# Patient Record
Sex: MALE | Race: WHITE | NOT HISPANIC OR LATINO | Employment: OTHER | URBAN - METROPOLITAN AREA
[De-identification: names, ages, dates, MRNs, and addresses within clinical notes are randomized per-mention and may not be internally consistent; named-entity substitution may affect disease eponyms.]

---

## 2022-09-20 ENCOUNTER — HOSPITAL ENCOUNTER (INPATIENT)
Facility: HOSPITAL | Age: 42
LOS: 21 days | Discharge: HOME | DRG: 641 | End: 2022-10-12
Attending: EMERGENCY MEDICINE | Admitting: EMERGENCY MEDICINE
Payer: MEDICARE

## 2022-09-20 DIAGNOSIS — R07.9 CHEST PAIN: ICD-10-CM

## 2022-09-20 DIAGNOSIS — B35.1 ONYCHOMYCOSIS: Primary | ICD-10-CM

## 2022-09-20 DIAGNOSIS — B35.3 TINEA PEDIS OF BOTH FEET: ICD-10-CM

## 2022-09-20 DIAGNOSIS — E86.0 DEHYDRATION: ICD-10-CM

## 2022-09-20 DIAGNOSIS — G80.8 OTHER CEREBRAL PALSY: ICD-10-CM

## 2022-09-20 DIAGNOSIS — R00.0 TACHYCARDIA: ICD-10-CM

## 2022-09-20 LAB
ALBUMIN SERPL BCP-MCNC: 5 G/DL (ref 3.5–5.2)
ALP SERPL-CCNC: 101 U/L (ref 55–135)
ALT SERPL W/O P-5'-P-CCNC: 19 U/L (ref 10–44)
ANION GAP SERPL CALC-SCNC: 24 MMOL/L (ref 8–16)
AST SERPL-CCNC: 18 U/L (ref 10–40)
BASOPHILS # BLD AUTO: 0.06 K/UL (ref 0–0.2)
BASOPHILS NFR BLD: 0.3 % (ref 0–1.9)
BILIRUB SERPL-MCNC: 2.4 MG/DL (ref 0.1–1)
BUN SERPL-MCNC: 17 MG/DL (ref 6–20)
CALCIUM SERPL-MCNC: 10.7 MG/DL (ref 8.7–10.5)
CHLORIDE SERPL-SCNC: 113 MMOL/L (ref 95–110)
CK SERPL-CCNC: 43 U/L (ref 20–200)
CO2 SERPL-SCNC: 14 MMOL/L (ref 23–29)
CREAT SERPL-MCNC: 1.2 MG/DL (ref 0.5–1.4)
CTP QC/QA: YES
DIFFERENTIAL METHOD: ABNORMAL
EOSINOPHIL # BLD AUTO: 0 K/UL (ref 0–0.5)
EOSINOPHIL NFR BLD: 0.1 % (ref 0–8)
ERYTHROCYTE [DISTWIDTH] IN BLOOD BY AUTOMATED COUNT: 13.2 % (ref 11.5–14.5)
EST. GFR  (NO RACE VARIABLE): >60 ML/MIN/1.73 M^2
GLUCOSE SERPL-MCNC: 115 MG/DL (ref 70–110)
HCT VFR BLD AUTO: 54.4 % (ref 40–54)
HGB BLD-MCNC: 19 G/DL (ref 14–18)
IMM GRANULOCYTES # BLD AUTO: 0.06 K/UL (ref 0–0.04)
IMM GRANULOCYTES NFR BLD AUTO: 0.3 % (ref 0–0.5)
LYMPHOCYTES # BLD AUTO: 1.6 K/UL (ref 1–4.8)
LYMPHOCYTES NFR BLD: 8.9 % (ref 18–48)
MAGNESIUM SERPL-MCNC: 2.2 MG/DL (ref 1.6–2.6)
MCH RBC QN AUTO: 29.9 PG (ref 27–31)
MCHC RBC AUTO-ENTMCNC: 34.9 G/DL (ref 32–36)
MCV RBC AUTO: 86 FL (ref 82–98)
MONOCYTES # BLD AUTO: 1.1 K/UL (ref 0.3–1)
MONOCYTES NFR BLD: 5.9 % (ref 4–15)
NEUTROPHILS # BLD AUTO: 15.1 K/UL (ref 1.8–7.7)
NEUTROPHILS NFR BLD: 84.5 % (ref 38–73)
NRBC BLD-RTO: 0 /100 WBC
PLATELET # BLD AUTO: 368 K/UL (ref 150–450)
PMV BLD AUTO: 9.1 FL (ref 9.2–12.9)
POTASSIUM SERPL-SCNC: 4.5 MMOL/L (ref 3.5–5.1)
PROT SERPL-MCNC: 8.7 G/DL (ref 6–8.4)
RBC # BLD AUTO: 6.36 M/UL (ref 4.6–6.2)
SARS-COV-2 RDRP RESP QL NAA+PROBE: NEGATIVE
SODIUM SERPL-SCNC: 151 MMOL/L (ref 136–145)
WBC # BLD AUTO: 17.83 K/UL (ref 3.9–12.7)

## 2022-09-20 PROCEDURE — 82550 ASSAY OF CK (CPK): CPT | Performed by: EMERGENCY MEDICINE

## 2022-09-20 PROCEDURE — 96374 THER/PROPH/DIAG INJ IV PUSH: CPT

## 2022-09-20 PROCEDURE — 99285 EMERGENCY DEPT VISIT HI MDM: CPT | Mod: 25

## 2022-09-20 PROCEDURE — 85025 COMPLETE CBC W/AUTO DIFF WBC: CPT | Performed by: EMERGENCY MEDICINE

## 2022-09-20 PROCEDURE — 96375 TX/PRO/DX INJ NEW DRUG ADDON: CPT

## 2022-09-20 PROCEDURE — 63600175 PHARM REV CODE 636 W HCPCS: Performed by: EMERGENCY MEDICINE

## 2022-09-20 PROCEDURE — 83735 ASSAY OF MAGNESIUM: CPT | Performed by: EMERGENCY MEDICINE

## 2022-09-20 PROCEDURE — U0002 COVID-19 LAB TEST NON-CDC: HCPCS | Performed by: EMERGENCY MEDICINE

## 2022-09-20 PROCEDURE — 96361 HYDRATE IV INFUSION ADD-ON: CPT

## 2022-09-20 PROCEDURE — 93010 ELECTROCARDIOGRAM REPORT: CPT | Mod: ,,, | Performed by: INTERNAL MEDICINE

## 2022-09-20 PROCEDURE — 80053 COMPREHEN METABOLIC PANEL: CPT | Performed by: EMERGENCY MEDICINE

## 2022-09-20 PROCEDURE — 25000003 PHARM REV CODE 250: Performed by: EMERGENCY MEDICINE

## 2022-09-20 PROCEDURE — 93005 ELECTROCARDIOGRAM TRACING: CPT

## 2022-09-20 PROCEDURE — 93010 EKG 12-LEAD: ICD-10-PCS | Mod: ,,, | Performed by: INTERNAL MEDICINE

## 2022-09-20 RX ORDER — HALOPERIDOL 5 MG/ML
5 INJECTION INTRAMUSCULAR
Status: DISCONTINUED | OUTPATIENT
Start: 2022-09-20 | End: 2022-09-21

## 2022-09-20 RX ORDER — DIPHENHYDRAMINE HYDROCHLORIDE 50 MG/ML
12.5 INJECTION INTRAMUSCULAR; INTRAVENOUS
Status: DISCONTINUED | OUTPATIENT
Start: 2022-09-20 | End: 2022-09-21

## 2022-09-20 RX ADMIN — FOLIC ACID: 5 INJECTION, SOLUTION INTRAMUSCULAR; INTRAVENOUS; SUBCUTANEOUS at 10:09

## 2022-09-20 RX ADMIN — SODIUM CHLORIDE 2000 ML: 0.9 INJECTION, SOLUTION INTRAVENOUS at 10:09

## 2022-09-21 PROBLEM — E83.39 HYPOPHOSPHATEMIA: Status: ACTIVE | Noted: 2022-09-21

## 2022-09-21 PROBLEM — E83.52 HYPERCALCEMIA: Status: ACTIVE | Noted: 2022-09-21

## 2022-09-21 PROBLEM — E87.0 DEHYDRATION WITH HYPERNATREMIA: Status: ACTIVE | Noted: 2022-09-21

## 2022-09-21 PROBLEM — R65.10 SIRS (SYSTEMIC INFLAMMATORY RESPONSE SYNDROME): Status: ACTIVE | Noted: 2022-09-21

## 2022-09-21 PROBLEM — B35.3 TINEA PEDIS OF BOTH FEET: Status: ACTIVE | Noted: 2022-09-21

## 2022-09-21 PROBLEM — B35.1 ONYCHOMYCOSIS: Status: ACTIVE | Noted: 2022-09-21

## 2022-09-21 PROBLEM — G80.9 CEREBRAL PALSY: Status: ACTIVE | Noted: 2022-09-21

## 2022-09-21 PROBLEM — E86.0 DEHYDRATION: Status: ACTIVE | Noted: 2022-09-21

## 2022-09-21 LAB
ALBUMIN SERPL BCP-MCNC: 3.8 G/DL (ref 3.5–5.2)
ALLENS TEST: ABNORMAL
ALP SERPL-CCNC: 72 U/L (ref 55–135)
ALT SERPL W/O P-5'-P-CCNC: 17 U/L (ref 10–44)
ANION GAP SERPL CALC-SCNC: 11 MMOL/L (ref 8–16)
AST SERPL-CCNC: 14 U/L (ref 10–40)
B-OH-BUTYR BLD STRIP-SCNC: 4.6 MMOL/L (ref 0–0.5)
BASOPHILS # BLD AUTO: 0.04 K/UL (ref 0–0.2)
BASOPHILS NFR BLD: 0.3 % (ref 0–1.9)
BILIRUB SERPL-MCNC: 2.2 MG/DL (ref 0.1–1)
BILIRUB UR QL STRIP: NEGATIVE
BUN SERPL-MCNC: 11 MG/DL (ref 6–20)
CALCIUM SERPL-MCNC: 8.7 MG/DL (ref 8.7–10.5)
CHLORIDE SERPL-SCNC: 117 MMOL/L (ref 95–110)
CLARITY UR: CLEAR
CO2 SERPL-SCNC: 17 MMOL/L (ref 23–29)
COLOR UR: YELLOW
CREAT SERPL-MCNC: 0.8 MG/DL (ref 0.5–1.4)
DELSYS: ABNORMAL
DIFFERENTIAL METHOD: ABNORMAL
EOSINOPHIL # BLD AUTO: 0 K/UL (ref 0–0.5)
EOSINOPHIL NFR BLD: 0.2 % (ref 0–8)
ERYTHROCYTE [DISTWIDTH] IN BLOOD BY AUTOMATED COUNT: 13.1 % (ref 11.5–14.5)
EST. GFR  (NO RACE VARIABLE): >60 ML/MIN/1.73 M^2
GLUCOSE SERPL-MCNC: 99 MG/DL (ref 70–110)
GLUCOSE UR QL STRIP: NEGATIVE
HCO3 UR-SCNC: 18.3 MMOL/L (ref 24–28)
HCT VFR BLD AUTO: 44.8 % (ref 40–54)
HGB BLD-MCNC: 15.3 G/DL (ref 14–18)
HGB UR QL STRIP: ABNORMAL
IMM GRANULOCYTES # BLD AUTO: 0.04 K/UL (ref 0–0.04)
IMM GRANULOCYTES NFR BLD AUTO: 0.3 % (ref 0–0.5)
KETONES UR QL STRIP: ABNORMAL
LACTATE SERPL-SCNC: 1.7 MMOL/L (ref 0.5–2.2)
LEUKOCYTE ESTERASE UR QL STRIP: NEGATIVE
LYMPHOCYTES # BLD AUTO: 2.2 K/UL (ref 1–4.8)
LYMPHOCYTES NFR BLD: 17.6 % (ref 18–48)
MAGNESIUM SERPL-MCNC: 1.9 MG/DL (ref 1.6–2.6)
MCH RBC QN AUTO: 29.3 PG (ref 27–31)
MCHC RBC AUTO-ENTMCNC: 34.2 G/DL (ref 32–36)
MCV RBC AUTO: 86 FL (ref 82–98)
MODE: ABNORMAL
MONOCYTES # BLD AUTO: 1 K/UL (ref 0.3–1)
MONOCYTES NFR BLD: 8.3 % (ref 4–15)
NEUTROPHILS # BLD AUTO: 9 K/UL (ref 1.8–7.7)
NEUTROPHILS NFR BLD: 73.3 % (ref 38–73)
NITRITE UR QL STRIP: NEGATIVE
NRBC BLD-RTO: 0 /100 WBC
PCO2 BLDA: 30 MMHG (ref 35–45)
PH SMN: 7.39 [PH] (ref 7.35–7.45)
PH UR STRIP: 6 [PH] (ref 5–8)
PHOSPHATE SERPL-MCNC: 2.6 MG/DL (ref 2.7–4.5)
PLATELET # BLD AUTO: 285 K/UL (ref 150–450)
PMV BLD AUTO: 8.8 FL (ref 9.2–12.9)
PO2 BLDA: 60 MMHG (ref 40–60)
POC BE: -5 MMOL/L
POC SATURATED O2: 91 % (ref 95–100)
POC TCO2: 19 MMOL/L (ref 24–29)
POTASSIUM SERPL-SCNC: 3.9 MMOL/L (ref 3.5–5.1)
PROT SERPL-MCNC: 6.5 G/DL (ref 6–8.4)
PROT UR QL STRIP: ABNORMAL
PTH-INTACT SERPL-MCNC: 115.2 PG/ML (ref 9–77)
RBC # BLD AUTO: 5.22 M/UL (ref 4.6–6.2)
SAMPLE: ABNORMAL
SITE: ABNORMAL
SODIUM SERPL-SCNC: 145 MMOL/L (ref 136–145)
SP GR UR STRIP: 1.02 (ref 1–1.03)
SP02: 96
T4 FREE SERPL-MCNC: 1.27 NG/DL (ref 0.71–1.51)
TSH SERPL DL<=0.005 MIU/L-ACNC: 0.12 UIU/ML (ref 0.4–4)
URN SPEC COLLECT METH UR: ABNORMAL
UROBILINOGEN UR STRIP-ACNC: ABNORMAL EU/DL
WBC # BLD AUTO: 12.31 K/UL (ref 3.9–12.7)

## 2022-09-21 PROCEDURE — 25000003 PHARM REV CODE 250: Performed by: INTERNAL MEDICINE

## 2022-09-21 PROCEDURE — 82010 KETONE BODYS QUAN: CPT | Performed by: EMERGENCY MEDICINE

## 2022-09-21 PROCEDURE — 85025 COMPLETE CBC W/AUTO DIFF WBC: CPT | Performed by: INTERNAL MEDICINE

## 2022-09-21 PROCEDURE — 83970 ASSAY OF PARATHORMONE: CPT | Performed by: INTERNAL MEDICINE

## 2022-09-21 PROCEDURE — 25000003 PHARM REV CODE 250: Performed by: HOSPITALIST

## 2022-09-21 PROCEDURE — 81003 URINALYSIS AUTO W/O SCOPE: CPT | Performed by: INTERNAL MEDICINE

## 2022-09-21 PROCEDURE — 83605 ASSAY OF LACTIC ACID: CPT | Performed by: EMERGENCY MEDICINE

## 2022-09-21 PROCEDURE — 84439 ASSAY OF FREE THYROXINE: CPT | Performed by: INTERNAL MEDICINE

## 2022-09-21 PROCEDURE — 80053 COMPREHEN METABOLIC PANEL: CPT | Performed by: INTERNAL MEDICINE

## 2022-09-21 PROCEDURE — 84443 ASSAY THYROID STIM HORMONE: CPT | Performed by: INTERNAL MEDICINE

## 2022-09-21 PROCEDURE — S5010 5% DEXTROSE AND 0.45% SALINE: HCPCS | Performed by: INTERNAL MEDICINE

## 2022-09-21 PROCEDURE — 84100 ASSAY OF PHOSPHORUS: CPT | Performed by: INTERNAL MEDICINE

## 2022-09-21 PROCEDURE — 99900035 HC TECH TIME PER 15 MIN (STAT)

## 2022-09-21 PROCEDURE — 11000001 HC ACUTE MED/SURG PRIVATE ROOM

## 2022-09-21 PROCEDURE — 82803 BLOOD GASES ANY COMBINATION: CPT

## 2022-09-21 PROCEDURE — 83735 ASSAY OF MAGNESIUM: CPT | Performed by: INTERNAL MEDICINE

## 2022-09-21 RX ORDER — SODIUM CHLORIDE 0.9 % (FLUSH) 0.9 %
10 SYRINGE (ML) INJECTION EVERY 12 HOURS PRN
Status: DISCONTINUED | OUTPATIENT
Start: 2022-09-21 | End: 2022-10-12 | Stop reason: HOSPADM

## 2022-09-21 RX ORDER — DEXTROSE MONOHYDRATE AND SODIUM CHLORIDE 5; .45 G/100ML; G/100ML
INJECTION, SOLUTION INTRAVENOUS CONTINUOUS
Status: DISCONTINUED | OUTPATIENT
Start: 2022-09-21 | End: 2022-09-22

## 2022-09-21 RX ORDER — TALC
6 POWDER (GRAM) TOPICAL NIGHTLY PRN
Status: DISCONTINUED | OUTPATIENT
Start: 2022-09-21 | End: 2022-10-12 | Stop reason: HOSPADM

## 2022-09-21 RX ORDER — MAG HYDROX/ALUMINUM HYD/SIMETH 200-200-20
30 SUSPENSION, ORAL (FINAL DOSE FORM) ORAL 4 TIMES DAILY PRN
Status: DISCONTINUED | OUTPATIENT
Start: 2022-09-21 | End: 2022-10-12 | Stop reason: HOSPADM

## 2022-09-21 RX ORDER — ACETAMINOPHEN 325 MG/1
650 TABLET ORAL EVERY 4 HOURS PRN
Status: DISCONTINUED | OUTPATIENT
Start: 2022-09-21 | End: 2022-10-12 | Stop reason: HOSPADM

## 2022-09-21 RX ORDER — SODIUM,POTASSIUM PHOSPHATES 280-250MG
1 POWDER IN PACKET (EA) ORAL ONCE
Status: COMPLETED | OUTPATIENT
Start: 2022-09-21 | End: 2022-09-21

## 2022-09-21 RX ORDER — AMOXICILLIN 250 MG
1 CAPSULE ORAL 2 TIMES DAILY PRN
Status: DISCONTINUED | OUTPATIENT
Start: 2022-09-21 | End: 2022-10-12 | Stop reason: HOSPADM

## 2022-09-21 RX ORDER — ONDANSETRON 2 MG/ML
4 INJECTION INTRAMUSCULAR; INTRAVENOUS EVERY 8 HOURS PRN
Status: DISCONTINUED | OUTPATIENT
Start: 2022-09-21 | End: 2022-10-12 | Stop reason: HOSPADM

## 2022-09-21 RX ORDER — PROCHLORPERAZINE EDISYLATE 5 MG/ML
5 INJECTION INTRAMUSCULAR; INTRAVENOUS EVERY 6 HOURS PRN
Status: DISCONTINUED | OUTPATIENT
Start: 2022-09-21 | End: 2022-10-12 | Stop reason: HOSPADM

## 2022-09-21 RX ORDER — POLYETHYLENE GLYCOL 3350 17 G/17G
17 POWDER, FOR SOLUTION ORAL DAILY
Status: DISCONTINUED | OUTPATIENT
Start: 2022-09-21 | End: 2022-10-12 | Stop reason: HOSPADM

## 2022-09-21 RX ORDER — TERBINAFINE HYDROCHLORIDE 250 MG/1
250 TABLET ORAL DAILY
Status: COMPLETED | OUTPATIENT
Start: 2022-09-21 | End: 2022-10-04

## 2022-09-21 RX ORDER — IBUPROFEN 200 MG
16 TABLET ORAL
Status: DISCONTINUED | OUTPATIENT
Start: 2022-09-21 | End: 2022-10-12 | Stop reason: HOSPADM

## 2022-09-21 RX ORDER — NALOXONE HCL 0.4 MG/ML
0.02 VIAL (ML) INJECTION
Status: DISCONTINUED | OUTPATIENT
Start: 2022-09-21 | End: 2022-10-12 | Stop reason: HOSPADM

## 2022-09-21 RX ORDER — IBUPROFEN 200 MG
24 TABLET ORAL
Status: DISCONTINUED | OUTPATIENT
Start: 2022-09-21 | End: 2022-10-12 | Stop reason: HOSPADM

## 2022-09-21 RX ORDER — OXYCODONE HYDROCHLORIDE 5 MG/1
5 TABLET ORAL EVERY 6 HOURS PRN
Status: DISCONTINUED | OUTPATIENT
Start: 2022-09-21 | End: 2022-09-21

## 2022-09-21 RX ORDER — GLUCAGON 1 MG
1 KIT INJECTION
Status: DISCONTINUED | OUTPATIENT
Start: 2022-09-21 | End: 2022-10-12 | Stop reason: HOSPADM

## 2022-09-21 RX ORDER — SIMETHICONE 80 MG
1 TABLET,CHEWABLE ORAL 4 TIMES DAILY PRN
Status: DISCONTINUED | OUTPATIENT
Start: 2022-09-21 | End: 2022-10-12 | Stop reason: HOSPADM

## 2022-09-21 RX ADMIN — THERA TABS 1 TABLET: TAB at 10:09

## 2022-09-21 RX ADMIN — POLYETHYLENE GLYCOL 3350 17 G: 17 POWDER, FOR SOLUTION ORAL at 10:09

## 2022-09-21 RX ADMIN — DEXTROSE AND SODIUM CHLORIDE: 5; .45 INJECTION, SOLUTION INTRAVENOUS at 06:09

## 2022-09-21 RX ADMIN — Medication 1 PACKET: at 10:09

## 2022-09-21 RX ADMIN — TERBINAFINE TABLETS 250 MG 250 MG: 250 TABLET ORAL at 05:09

## 2022-09-21 NOTE — NURSING
[facility] [place] [place]es [place] -- 4 [place]      9/21/2022       Skin assessed on: Admit      [x] No Pressure Injuries Present    [x]Prevention Measures Documented    [] Yes LDA  for Pressure Injury Previously documented     [] Yes New Pressure Injury Discovered   [] LDA for New Pressure Injury Added      Attending RN:  Helen Farfan RN     Second RN: Christy ElmoreRN

## 2022-09-21 NOTE — PROGRESS NOTES
Call back received from wife of father's friend (Julisa Adan)  [phone].  Ms. Adan stated that the patient's father's family in [facility] have been notified of the events.  The father did not have relatives here.  Ms. Montano stated that the patient's father had an  and they have called the  and are awaiting a call back.  SW asked Ms. Montano if she could provide our (hospital's) contact information to the .  She stated that she would do so.    Contacts:  Friends of patient's father:  Hafsa Londono  [phone]  Julisa Montano : [phone]

## 2022-09-21 NOTE — HPI
"Mr. Landin is a 40yo man with a past medical history of cerebral palsy with spasticity and anxiety.  At baseline, he is normally cared for by his father at home.  He is a poor historian and cannot state to me what happened today.    Dr. Spring, the ED staff, was able to gather some collateral from the EMS on arrival.  Apparently his father and he had not been seen for quite some time, so a care check was initiated.  On arrival, it was found that the patient's father had , and the patient was confined to his bed due to his mobility issues (bed-bound).  The last known contact with the patient and his father was 22.  In the interim, the patient has had no water or food, and had to languish in his own urine and stool until help arrived today.  The patient would not speak to me about his father's death despite my bringing it up directly.  All he will say repeatedly is, "something happened."  EMS did notify the patient on arrival that his father was , at which point he did become extremely upset.    In the ED his VS's were BP (!) 152/94   Pulse (!) 158 -> 127 -> 114   Temp 98 °F (36.7 °C)   Resp 19   Ht 6' (1.829 m)   Wt 68 kg (150 lb)   SpO2 (!) 94% RA  BMI 20.34 kg/m².  Labs showed WBC 18, Hg 19, HCT 54, .  , HCO3 14, BUN 17, Cr 1.2, AG 24.  Gluc 115, Ca 10.7, TB 2.4, normal LFT's.  CPK 43, LA 1.7, BHB 4.6.  COVID NEG. VBG pH 7.39, PCO2 30.     No radiographs were done in the ED.  (CXR now ordered and pending though).  EKG showed sinus tachycardia to 148 with MARYAM and non-specific ST changes.     In the ED he was treated with a banana bag bolus 2211, and NS 2L bolus 2218.  "

## 2022-09-21 NOTE — ED NOTES
"Patient fearful and expressing sadness with tears over losing father.  He stated "I am all alone now it'll never be okay again."  This RN tried to reassure patient and gently talk to him.  He is disheveled and hasn't had a bath in days.  Will continue to monitor.  "

## 2022-09-21 NOTE — ASSESSMENT & PLAN NOTE
Consult social work for placement  Unsure if he has any family to care for him now that his father has

## 2022-09-21 NOTE — PLAN OF CARE
[facility] - Med Surg  Initial Discharge Assessment       Primary Care Provider: Primary Doctor No    Admission Diagnosis: Dehydration [E86.0]  Tachycardia [R00.0]  Chest pain [R07.9]    Admission Date: 2022  Expected Discharge Date:     Discharge Barriers Identified: No family/friends to help, Other (see comments)    Payor: [employer] [place] MEDICARE / Plan: [employer] SNP (SPECIAL NEEDS PLAN) / Product Type: Medicare Advantage /     No emergency contact information on file.    Discharge Plan A: Other (TBD)         [id number] [place] - [address]  [address] [place] [address]  Phone: [phone] Fax: [phone]      Initial Assessment (most recent)       Adult Discharge Assessment - 22          Discharge Assessment    Assessment Type Discharge Planning Assessment     Confirmed/corrected address, phone number and insurance --   Per chart review    Source of Information other (see comments)     If unable to respond/provide information was family/caregiver contacted? No Contact Information Available   Parents are .  Awaiting call from family atty.    Reason For Admission Principal Problem:Dehydration with hypernatremia     Lives With parent(s)     Facility Arrived From: Home     Do you expect to return to your current living situation? No   Case management consulted to locate next of kin.    Prior to hospitilization cognitive status: Unable to Assess     Current cognitive status: Unable to Assess     Walking or Climbing Stairs Difficulty other (see comments)   unable to determine    Dressing/Bathing Difficulty bathing difficulty, assistance 1 person   Appears to have needed assistance.  Upper extremity contractures noted.    Equipment Currently Used at Home other (see comments)   unknown    Do you have prescription coverage? Yes     Coverage [employer]     Are you on dialysis? No     Do you take coumadin? No     Discharge Plan A Other   TBD    DME  Needed Upon Discharge  other (see comments)   Would benefit from PT/OT eval for DME recommendations.    Discharge Plan discussed with: --   No caregiver available at this time.    Discharge Barriers Identified No family/friends to help;Other (see comments)        Relationship/Environment    Name(s) of Who Lives With Patient Parents .  Father most recent .                   Patient brought to hospital following a home wellness check by local law enforcement.  Patient's father discovered  in the home.  Case management has been consulted to locate next of kin.  Awaiting call from family friend and/or  to assist with locating family.

## 2022-09-21 NOTE — ASSESSMENT & PLAN NOTE
In the ED he was treated with a banana bag bolus 2211, and NS 2L bolus 2218.  May need to change to 1/4 NS soon  Follow up repeat CMP         dextrose 5 % and 0.45 % NaCl infusion, , Intravenous, Continuous, 150cc/hr

## 2022-09-21 NOTE — PROGRESS NOTES
Assumed care of Mr Miguelito Landin who is bedbound from cerebral palsy. Admitted with hypernatremia, hypercalcemia due to dehydration from lack of access to food/water after his caretaker/father . Electrolyte disturbances improved. Will continue IVF. Will look for wounds today with nursing. Social work consult for next of kin- no number in chart.    Maliha Chavarria MD  2022  8:38 AM

## 2022-09-21 NOTE — H&P
"[facility]  [facility] Medicine  History & Physical    Patient Name: Miguelito Landin  MRN: [id number]  Patient Class: IP- Inpatient  Admission Date: 2022  Attending Physician: Maliha Chavarria MD   Primary Care Provider: No primary care provider on file.         Patient information was obtained from patient, past medical records and ER records.     Subjective:     Principal Problem:Dehydration with hypernatremia    Chief Complaint:   Chief Complaint   Patient presents with    Dehydration     Pt arrived via EMS. Per EMS, "pt was found in bed for unknown amount of time covered with fecal matter and urine. Pt father is pt caregiver and was found  in residence. Last known contact with pt father was on . Pt reports he did not know his father was  or last known when he ate or had anything to drink. Pt is bed bound due to Cerebral Palsy with contractures to extremities. Pt became extremely upset when informed of father status and began SVT rhythm."        HPI: Mr. Landin is a 42yo man with a past medical history of cerebral palsy with spasticity and anxiety.  At baseline, he is normally cared for by his father at home.  He is a poor historian and cannot state to me what happened today.    Dr. Spring, the ED staff, was able to gather some collateral from the EMS on arrival.  Apparently his father and he had not been seen for quite some time, so a care check was initiated.  On arrival, it was found that the patient's father had , and the patient was confined to his bed due to his mobility issues (bed-bound).  The last known contact with the patient and his father was 22.  In the interim, the patient has had no water or food, and had to languish in his own urine and stool until help arrived today.  The patient would not speak to me about his father's death despite my bringing it up directly.  All he will say repeatedly is, "something happened."  EMS did notify the " patient on arrival that his father was , at which point he did become extremely upset.    In the ED his VS's were BP (!) 152/94   Pulse (!) 158 -> 127 -> 114   Temp 98 °F (36.7 °C)   Resp 19   Ht 6' (1.829 m)   Wt 68 kg (150 lb)   SpO2 (!) 94% RA  BMI 20.34 kg/m².  Labs showed WBC 18, Hg 19, HCT 54, .  , HCO3 14, BUN 17, Cr 1.2, AG 24.  Gluc 115, Ca 10.7, TB 2.4, normal LFT's.  CPK 43, LA 1.7, BHB 4.6.  COVID NEG. VBG pH 7.39, PCO2 30.     No radiographs were done in the ED.  (CXR now ordered and pending though).  EKG showed sinus tachycardia to 148 with MARYAM and non-specific ST changes.     In the ED he was treated with a banana bag bolus , and NS 2L bolus .      Past Medical History:   Diagnosis Date    Anxiety     CP (cerebral palsy)     Spasticity        History reviewed. No pertinent surgical history.    Review of patient's allergies indicates:   Allergen Reactions    Doxycycline Rash       No current facility-administered medications on file prior to encounter.     No current outpatient medications on file prior to encounter.     Family History       Problem Relation (Age of Onset)    Cancer Paternal Uncle    Heart attack Maternal Grandmother    Migraines Mother    Panic disorder Mother          Tobacco Use    Smoking status: Not on file    Smokeless tobacco: Never   Substance and Sexual Activity    Alcohol use: No    Drug use: No    Sexual activity: Never     Birth control/protection: None     Review of Systems   Unable to perform ROS: Mental status change   Objective:     Vital Signs (Most Recent):  Temp: 98 °F (36.7 °C) (22)  Pulse: (!) 114 (22)  Resp: 19 (22)  BP: (!) 152/94 (22)  SpO2: (!) 94 % (22)   Vital Signs (24h Range):  Temp:  [98 °F (36.7 °C)-98.5 °F (36.9 °C)] 98 °F (36.7 °C)  Pulse:  [114-158] 114  Resp:  [18-26] 19  SpO2:  [93 %-96 %] 94 %  BP: (135-154)/(86-98) 152/94     Weight: 68 kg (150  lb)  Body mass index is 20.34 kg/m².    Physical Exam  Constitutional:       General: He is not in acute distress.     Appearance: He is underweight. He is ill-appearing. He is not toxic-appearing or diaphoretic.   HENT:      Head: Normocephalic and atraumatic.      Comments: Severely dry mucous membranes  Eyes:      Conjunctiva/sclera:      Right eye: Right conjunctiva is not injected.      Left eye: Left conjunctiva is not injected.   Neck:      Thyroid: No thyromegaly.   Cardiovascular:      Rate and Rhythm: Regular rhythm. Tachycardia present.      Heart sounds:   No systolic murmur is present.   Pulmonary:      Effort: Pulmonary effort is normal. No tachypnea or bradypnea.      Breath sounds: No decreased breath sounds, wheezing, rhonchi or rales.   Chest:      Chest wall: No swelling or tenderness.   Abdominal:      General: Abdomen is flat. Bowel sounds are normal. There is no distension.      Palpations: Abdomen is soft.      Tenderness: There is no abdominal tenderness.   Genitourinary:     Comments: No parikh in place  Musculoskeletal:      Cervical back: Neck supple.      Comments: Muscle wasting and diffuse contractures of his arms and hands from CP   Lymphadenopathy:      Comments: No peripheral edema   Skin:     Coloration: Skin is sallow.      Findings: No rash.      Comments: Unkempt skin with foul body odor and untrimmed nails   Neurological:      Mental Status: He is lethargic and disoriented.      GCS: GCS eye subscore is 4. GCS verbal subscore is 5. GCS motor subscore is 6.   Psychiatric:         Attention and Perception: He is inattentive.         Mood and Affect: Mood is depressed. Affect is flat and tearful.         Speech: Speech is delayed.         Behavior: Behavior is uncooperative, slowed and withdrawn.         Cognition and Memory: Cognition is impaired. Memory is impaired. He exhibits impaired recent memory and impaired remote memory.           Significant Labs: All pertinent labs within  the past 24 hours have been reviewed.  Recent Results (from the past 24 hour(s))   CBC auto differential    Collection Time: 09/20/22 10:11 PM   Result Value Ref Range    WBC 17.83 (H) 3.90 - 12.70 K/uL    RBC 6.36 (H) 4.60 - 6.20 M/uL    Hemoglobin 19.0 (H) 14.0 - 18.0 g/dL    Hematocrit 54.4 (H) 40.0 - 54.0 %    MCV 86 82 - 98 fL    MCH 29.9 27.0 - 31.0 pg    MCHC 34.9 32.0 - 36.0 g/dL    RDW 13.2 11.5 - 14.5 %    Platelets 368 150 - 450 K/uL    MPV 9.1 (L) 9.2 - 12.9 fL    Immature Granulocytes 0.3 0.0 - 0.5 %    Gran # (ANC) 15.1 (H) 1.8 - 7.7 K/uL    Immature Grans (Abs) 0.06 (H) 0.00 - 0.04 K/uL    Lymph # 1.6 1.0 - 4.8 K/uL    Mono # 1.1 (H) 0.3 - 1.0 K/uL    Eos # 0.0 0.0 - 0.5 K/uL    Baso # 0.06 0.00 - 0.20 K/uL    nRBC 0 0 /100 WBC    Gran % 84.5 (H) 38.0 - 73.0 %    Lymph % 8.9 (L) 18.0 - 48.0 %    Mono % 5.9 4.0 - 15.0 %    Eosinophil % 0.1 0.0 - 8.0 %    Basophil % 0.3 0.0 - 1.9 %    Differential Method Automated    Comprehensive metabolic panel    Collection Time: 09/20/22 10:11 PM   Result Value Ref Range    Sodium 151 (H) 136 - 145 mmol/L    Potassium 4.5 3.5 - 5.1 mmol/L    Chloride 113 (H) 95 - 110 mmol/L    CO2 14 (L) 23 - 29 mmol/L    Glucose 115 (H) 70 - 110 mg/dL    BUN 17 6 - 20 mg/dL    Creatinine 1.2 0.5 - 1.4 mg/dL    Calcium 10.7 (H) 8.7 - 10.5 mg/dL    Total Protein 8.7 (H) 6.0 - 8.4 g/dL    Albumin 5.0 3.5 - 5.2 g/dL    Total Bilirubin 2.4 (H) 0.1 - 1.0 mg/dL    Alkaline Phosphatase 101 55 - 135 U/L    AST 18 10 - 40 U/L    ALT 19 10 - 44 U/L    Anion Gap 24 (H) 8 - 16 mmol/L    eGFR >60 >60 mL/min/1.73 m^2   CPK    Collection Time: 09/20/22 10:11 PM   Result Value Ref Range    CPK 43 20 - 200 U/L   Magnesium    Collection Time: 09/20/22 10:11 PM   Result Value Ref Range    Magnesium 2.2 1.6 - 2.6 mg/dL   POCT COVID-19 Rapid Screening    Collection Time: 09/20/22 10:34 PM   Result Value Ref Range    POC Rapid COVID Negative Negative     Acceptable Yes    Lactic acid,  plasma    Collection Time: 22 12:10 AM   Result Value Ref Range    Lactate (Lactic Acid) 1.7 0.5 - 2.2 mmol/L   Beta - Hydroxybutyrate, Serum    Collection Time: 22 12:10 AM   Result Value Ref Range    Beta-Hydroxybutyrate 4.6 (H) 0.0 - 0.5 mmol/L   ISTAT PROCEDURE    Collection Time: 22 12:16 AM   Result Value Ref Range    POC PH 7.393 7.35 - 7.45    POC PCO2 30.0 (L) 35 - 45 mmHg    POC PO2 60 40 - 60 mmHg    POC HCO3 18.3 (L) 24 - 28 mmol/L    POC BE -5 -2 to 2 mmol/L    POC SATURATED O2 91 (L) 95 - 100 %    POC TCO2 19 (L) 24 - 29 mmol/L    Sample VENOUS     Site Other     Allens Test N/A     DelSys Room Air     Mode SPONT     Sp02 96          Significant Imaging: I have reviewed all pertinent imaging results/findings within the past 24 hours.    Assessment/Plan:     * Dehydration with hypernatremia  In the ED he was treated with a banana bag bolus , and NS 2L bolus .  May need to change to 1/4 NS soon  Follow up repeat CMP         dextrose 5 % and 0.45 % NaCl infusion, , Intravenous, Continuous, 150cc/hr             SIRS (systemic inflammatory response syndrome)  I think the leukocytosis and tachycardia are all just dehydration  Regardless, ordered UA and CXR to be sure no aspiration or UTI as well        Hypercalcemia  Suspect severe dehydration  Follow up in am after fluids  Check PTH and TSH      Cerebral palsy  Consult social work for placement  Unsure if he has any family to care for him now that his father has         VTE Risk Mitigation (From admission, onward)         Ordered     IP VTE LOW RISK PATIENT  Once         22     Place sequential compression device  Until discontinued         22     Place DAPHNE hose  Until discontinued         22                   CARLEEN Mcmahan MD  Department of Hospital Medicine   [facility] [place] - Emergency Dept

## 2022-09-21 NOTE — SUBJECTIVE & OBJECTIVE
Past Medical History:   Diagnosis Date    Anxiety     CP (cerebral palsy)     Spasticity        History reviewed. No pertinent surgical history.    Review of patient's allergies indicates:   Allergen Reactions    Doxycycline Rash       No current facility-administered medications on file prior to encounter.     No current outpatient medications on file prior to encounter.     Family History       Problem Relation (Age of Onset)    Cancer Paternal Uncle    Heart attack Maternal Grandmother    Migraines Mother    Panic disorder Mother          Tobacco Use    Smoking status: Not on file    Smokeless tobacco: Never   Substance and Sexual Activity    Alcohol use: No    Drug use: No    Sexual activity: Never     Birth control/protection: None     Review of Systems   Unable to perform ROS: Mental status change   Objective:     Vital Signs (Most Recent):  Temp: 98 °F (36.7 °C) (09/20/22 2221)  Pulse: (!) 114 (09/21/22 0202)  Resp: 19 (09/21/22 0202)  BP: (!) 152/94 (09/21/22 0202)  SpO2: (!) 94 % (09/21/22 0202)   Vital Signs (24h Range):  Temp:  [98 °F (36.7 °C)-98.5 °F (36.9 °C)] 98 °F (36.7 °C)  Pulse:  [114-158] 114  Resp:  [18-26] 19  SpO2:  [93 %-96 %] 94 %  BP: (135-154)/(86-98) 152/94     Weight: 68 kg (150 lb)  Body mass index is 20.34 kg/m².    Physical Exam  Constitutional:       General: He is not in acute distress.     Appearance: He is underweight. He is ill-appearing. He is not toxic-appearing or diaphoretic.   HENT:      Head: Normocephalic and atraumatic.      Comments: Severely dry mucous membranes  Eyes:      Conjunctiva/sclera:      Right eye: Right conjunctiva is not injected.      Left eye: Left conjunctiva is not injected.   Neck:      Thyroid: No thyromegaly.   Cardiovascular:      Rate and Rhythm: Regular rhythm. Tachycardia present.      Heart sounds:   No systolic murmur is present.   Pulmonary:      Effort: Pulmonary effort is normal. No tachypnea or bradypnea.      Breath sounds: No decreased  breath sounds, wheezing, rhonchi or rales.   Chest:      Chest wall: No swelling or tenderness.   Abdominal:      General: Abdomen is flat. Bowel sounds are normal. There is no distension.      Palpations: Abdomen is soft.      Tenderness: There is no abdominal tenderness.   Genitourinary:     Comments: No parikh in place  Musculoskeletal:      Cervical back: Neck supple.      Comments: Muscle wasting and diffuse contractures of his arms and hands from CP   Lymphadenopathy:      Comments: No peripheral edema   Skin:     Coloration: Skin is sallow.      Findings: No rash.      Comments: Unkempt skin with foul body odor and untrimmed nails   Neurological:      Mental Status: He is lethargic and disoriented.      GCS: GCS eye subscore is 4. GCS verbal subscore is 5. GCS motor subscore is 6.   Psychiatric:         Attention and Perception: He is inattentive.         Mood and Affect: Mood is depressed. Affect is flat and tearful.         Speech: Speech is delayed.         Behavior: Behavior is uncooperative, slowed and withdrawn.         Cognition and Memory: Cognition is impaired. Memory is impaired. He exhibits impaired recent memory and impaired remote memory.           Significant Labs: All pertinent labs within the past 24 hours have been reviewed.  Recent Results (from the past 24 hour(s))   CBC auto differential    Collection Time: 09/20/22 10:11 PM   Result Value Ref Range    WBC 17.83 (H) 3.90 - 12.70 K/uL    RBC 6.36 (H) 4.60 - 6.20 M/uL    Hemoglobin 19.0 (H) 14.0 - 18.0 g/dL    Hematocrit 54.4 (H) 40.0 - 54.0 %    MCV 86 82 - 98 fL    MCH 29.9 27.0 - 31.0 pg    MCHC 34.9 32.0 - 36.0 g/dL    RDW 13.2 11.5 - 14.5 %    Platelets 368 150 - 450 K/uL    MPV 9.1 (L) 9.2 - 12.9 fL    Immature Granulocytes 0.3 0.0 - 0.5 %    Gran # (ANC) 15.1 (H) 1.8 - 7.7 K/uL    Immature Grans (Abs) 0.06 (H) 0.00 - 0.04 K/uL    Lymph # 1.6 1.0 - 4.8 K/uL    Mono # 1.1 (H) 0.3 - 1.0 K/uL    Eos # 0.0 0.0 - 0.5 K/uL    Baso # 0.06 0.00  - 0.20 K/uL    nRBC 0 0 /100 WBC    Gran % 84.5 (H) 38.0 - 73.0 %    Lymph % 8.9 (L) 18.0 - 48.0 %    Mono % 5.9 4.0 - 15.0 %    Eosinophil % 0.1 0.0 - 8.0 %    Basophil % 0.3 0.0 - 1.9 %    Differential Method Automated    Comprehensive metabolic panel    Collection Time: 09/20/22 10:11 PM   Result Value Ref Range    Sodium 151 (H) 136 - 145 mmol/L    Potassium 4.5 3.5 - 5.1 mmol/L    Chloride 113 (H) 95 - 110 mmol/L    CO2 14 (L) 23 - 29 mmol/L    Glucose 115 (H) 70 - 110 mg/dL    BUN 17 6 - 20 mg/dL    Creatinine 1.2 0.5 - 1.4 mg/dL    Calcium 10.7 (H) 8.7 - 10.5 mg/dL    Total Protein 8.7 (H) 6.0 - 8.4 g/dL    Albumin 5.0 3.5 - 5.2 g/dL    Total Bilirubin 2.4 (H) 0.1 - 1.0 mg/dL    Alkaline Phosphatase 101 55 - 135 U/L    AST 18 10 - 40 U/L    ALT 19 10 - 44 U/L    Anion Gap 24 (H) 8 - 16 mmol/L    eGFR >60 >60 mL/min/1.73 m^2   CPK    Collection Time: 09/20/22 10:11 PM   Result Value Ref Range    CPK 43 20 - 200 U/L   Magnesium    Collection Time: 09/20/22 10:11 PM   Result Value Ref Range    Magnesium 2.2 1.6 - 2.6 mg/dL   POCT COVID-19 Rapid Screening    Collection Time: 09/20/22 10:34 PM   Result Value Ref Range    POC Rapid COVID Negative Negative     Acceptable Yes    Lactic acid, plasma    Collection Time: 09/21/22 12:10 AM   Result Value Ref Range    Lactate (Lactic Acid) 1.7 0.5 - 2.2 mmol/L   Beta - Hydroxybutyrate, Serum    Collection Time: 09/21/22 12:10 AM   Result Value Ref Range    Beta-Hydroxybutyrate 4.6 (H) 0.0 - 0.5 mmol/L   ISTAT PROCEDURE    Collection Time: 09/21/22 12:16 AM   Result Value Ref Range    POC PH 7.393 7.35 - 7.45    POC PCO2 30.0 (L) 35 - 45 mmHg    POC PO2 60 40 - 60 mmHg    POC HCO3 18.3 (L) 24 - 28 mmol/L    POC BE -5 -2 to 2 mmol/L    POC SATURATED O2 91 (L) 95 - 100 %    POC TCO2 19 (L) 24 - 29 mmol/L    Sample VENOUS     Site Other     Allens Test N/A     DelSys Room Air     Mode SPONT     Sp02 96          Significant Imaging: I have reviewed all pertinent  imaging results/findings within the past 24 hours.

## 2022-09-21 NOTE — PROGRESS NOTES
Call back received from neighbor who called in the Wellness check.  The neighbor reported that most [place] the patient's [place]  She stated that the father  had a friend, Hafsa, who was going to contact his family in [place].  The contact number for Mr. Pereyra is [phone].  SW attempted contact with friend. Voice message left requesting a return call to the  at [phone].

## 2022-09-21 NOTE — PROGRESS NOTES
SW reviewed old medical records. Emergency contact listed  Megan Landin as mother.  Telephone number listed was incorrect.  Further search of mother's name revealed an obituary dated 8/8/2020.  The obituary listed patient (Miguelito Landin .) son and Miguelito Landin as spouse.   will continue search for next of kin.

## 2022-09-21 NOTE — ED NOTES
Received report from SYLVIE Morales. Introduced self at bedside, NS bolus and banana bag currently infusing.

## 2022-09-21 NOTE — ED PROVIDER NOTES
"Encounter Date: 2022    SCRIBE #1 NOTE: I, NATE WATKINSANG, am scribing for, and in the presence of,  Lolis Spring MD. I have scribed the following portions of the note - Other sections scribed: HPI, ROS, PE.     History     Chief Complaint   Patient presents with    Dehydration     Pt arrived via EMS. Per EMS, "pt was found in bed for unknown amount of time covered with fecal matter and urine. Pt father is pt caregiver and was found  in residence. Last known contact with pt father was on . Pt reports he did not know his father was  or last known when he ate or had anything to drink. Pt is bed bound due to Cerebral Palsy with contractures to extremities. Pt became extremely upset when informed of father status and began SVT rhythm."     41-year-old male patient with a past medical history of Cerebral palsy and Spasticity, presents to the ED via EMS with a chief complaint of dehydration onset PTA. Per EMS, the patient was found in his residence covered in urine and fecal matter. The patient is bedridden and was cared for by his father, who was found  in the residence. The reported last known contact with the patient's father was on 2022, The patient was unaware that his father was , and he is unsure of the last time he ate or drank. Patient states that he is not having any pains, and denies any recent falls or injuries. History is limited secondary to cerebral palsy.     The history is provided by the patient and the EMS personnel. The history is limited by the condition of the patient. No  was used.   Review of patient's allergies indicates:   Allergen Reactions    Doxycycline Rash     Past Medical History:   Diagnosis Date    Anxiety     CP (cerebral palsy)     Spasticity      History reviewed. No pertinent surgical history.  Family History   Problem Relation Age of Onset    Panic disorder Mother     Migraines Mother     Cancer Paternal " Uncle     Heart attack Maternal Grandmother      Social History     Tobacco Use    Smokeless tobacco: Never   Substance Use Topics    Alcohol use: No    Drug use: No     Review of Systems   Unable to perform ROS: Other     Physical Exam     Initial Vitals [09/20/22 2049]   BP Pulse Resp Temp SpO2   135/88 (!) 158 18 98.5 °F (36.9 °C) 96 %      MAP       --         Physical Exam    Nursing note and vitals reviewed.  Constitutional: He appears well-developed and well-nourished. He is not diaphoretic. No distress.   HENT:   Head: Normocephalic and atraumatic.   Mouth/Throat: Mucous membranes are dry.   Lips dry and cracked   Eyes: Conjunctivae are normal. Pupils are equal, round, and reactive to light.   Neck: Neck supple.   Cardiovascular:  Regular rhythm.   Tachycardia present.         Pulmonary/Chest: Breath sounds normal. No respiratory distress.   Abdominal: Abdomen is soft. Bowel sounds are normal. He exhibits no distension. There is no abdominal tenderness.   Musculoskeletal:         General: No edema.      Cervical back: Neck supple.     Neurological: He is alert.   Contracture of left upper extremity   Skin: Skin is warm and dry.   Psychiatric: His mood appears anxious.       ED Course   Procedures  Labs Reviewed   CBC W/ AUTO DIFFERENTIAL - Abnormal; Notable for the following components:       Result Value    WBC 17.83 (*)     RBC 6.36 (*)     Hemoglobin 19.0 (*)     Hematocrit 54.4 (*)     MPV 9.1 (*)     Gran # (ANC) 15.1 (*)     Immature Grans (Abs) 0.06 (*)     Mono # 1.1 (*)     Gran % 84.5 (*)     Lymph % 8.9 (*)     All other components within normal limits   COMPREHENSIVE METABOLIC PANEL - Abnormal; Notable for the following components:    Sodium 151 (*)     Chloride 113 (*)     CO2 14 (*)     Glucose 115 (*)     Calcium 10.7 (*)     Total Protein 8.7 (*)     Total Bilirubin 2.4 (*)     Anion Gap 24 (*)     All other components within normal limits   BETA - HYDROXYBUTYRATE, SERUM - Abnormal; Notable  for the following components:    Beta-Hydroxybutyrate 4.6 (*)     All other components within normal limits   ISTAT PROCEDURE - Abnormal; Notable for the following components:    POC PCO2 30.0 (*)     POC HCO3 18.3 (*)     POC SATURATED O2 91 (*)     POC TCO2 19 (*)     All other components within normal limits   CK   MAGNESIUM   LACTIC ACID, PLASMA   URINALYSIS, REFLEX TO URINE CULTURE   SARS-COV-2 RDRP GENE     EKG Readings: (Independently Interpreted)   Sinus tachycardia, rate 148 beats per minute, normal OH interval,  milliseconds.  No STEMI.     Imaging Results    None          Medications   haloperidol lactate injection 5 mg (5 mg Intramuscular Not Given 22)   diphenhydrAMINE injection 12.5 mg (12.5 mg Intramuscular Not Given 22)   sodium chloride 0.9% bolus 2,000 mL (2,000 mLs Intravenous New Bag 22)   sodium chloride 0.9% 1,000 mL with mvi, (ADULT) no.4 with vit K 3,300 unit- 150 mcg/10 mL 10 mL, thiamine 100 mg, folic acid 1 mg infusion ( Intravenous New Bag 22)     Medical Decision Making:   Initial Assessment:   41-year-old male with history of cerebral palsy presents to the EMS.  Per EMS, patient is bed-bound due to CP, he was found in his residence in the bed covered in urine and feces.  His father was at the resident was , last seen on .  The patient is unable to provide his own history.  On exam, the patient appears dehydrated, he has dry mucous membranes, he is tachycardic.  He denies any pain.  He has a contracture of the left upper extremity.  He became agitated with attempts at starting an IV, we will treat with Haldol and Benadryl, will give IV fluids and banana bag, will check labs including CPK and urinalysis.  Independently Interpreted Test(s):   I have ordered and independently interpreted EKG Reading(s) - see prior notes  Clinical Tests:   Lab Tests: Ordered and Reviewed  Medical Tests: Ordered and Reviewed        Scribe  Attestation:   Scribe #1: I performed the above scribed service and the documentation accurately describes the services I performed. I attest to the accuracy of the note.      ED Course as of 09/21/22 0112   Wed Sep 21, 2022   0045 , /91.  Labs within normal pH, lactic acid normal, serum ketones are elevated at 4.6.  CPK negative. CBC with elevated white blood cell count and H&H, suspect hemoconcentration.  Case reviewed with Dr. Mcmahan for hospital admission. [LH]      ED Course User Index  [LH] Lolis Spring MD                 Clinical Impression:   Final diagnoses:  [R00.0] Tachycardia  [E86.0] Dehydration     Scribe attestation: I, Lolis Spring MD, personally performed the services described in this documentation. All medical record entries made by the scribe were at my direction and in my presence.  I have reviewed the chart and agree that the record reflects my personal performance and is accurate and complete.     This dictation has been generated using M-Modal Fluency Direct dictation; some phonetic errors may occur.      ED Disposition Condition    Admit                 Lolis Spring MD  09/21/22 0113

## 2022-09-21 NOTE — ASSESSMENT & PLAN NOTE
I think the leukocytosis and tachycardia are all just dehydration  Regardless, ordered UA and CXR to be sure no aspiration or UTI as well

## 2022-09-21 NOTE — ED NOTES
Assumed care over pt. Pt answer questions in yes.no.pt able to stated first name. Pt denies pain.  on tele. Maintains airway. Resp even and unlabored.no edema note.d abd soft,no pain with palpation noted. 2+ bilat radial pulses. Bed in low and locked position. Call light inreach. Nurse will round frequently. Pt denies needs. HOB 45 degrees.

## 2022-09-21 NOTE — NURSING
Patient arrived to floor via stretcher via transporter from ED. Patient transferred to bed via x4 person assist. AAOx4. Patient was oriented to room, information on whiteboard, and medication regimen. Bed low, adequate lighting provided, side rails x2 up, call bell within reach. Admission assessment completed. IVF started. VSS. Patient placed on telemetry box 9268, placed sacral dressing, patient noted to have excoriation to dorsal side of penis external condom cath placed, and heel boots.Patient denied having any acute distress at this time. None observed. Will continue to monitor and follow treatment plan.

## 2022-09-21 NOTE — PROGRESS NOTES
JEREMIAH f/u with [facility] Department ( 8).  Due to current circumstance, name of person requesting a wellness check was provided.  JEREMIAH left a voice message for the caller requesting a return call to  at [phone].

## 2022-09-22 LAB
ALBUMIN SERPL BCP-MCNC: 3.7 G/DL (ref 3.5–5.2)
ALP SERPL-CCNC: 75 U/L (ref 55–135)
ALT SERPL W/O P-5'-P-CCNC: 13 U/L (ref 10–44)
ANION GAP SERPL CALC-SCNC: 8 MMOL/L (ref 8–16)
AST SERPL-CCNC: 15 U/L (ref 10–40)
BILIRUB SERPL-MCNC: 1.9 MG/DL (ref 0.1–1)
BUN SERPL-MCNC: 4 MG/DL (ref 6–20)
CALCIUM SERPL-MCNC: 8.9 MG/DL (ref 8.7–10.5)
CHLORIDE SERPL-SCNC: 111 MMOL/L (ref 95–110)
CO2 SERPL-SCNC: 22 MMOL/L (ref 23–29)
CREAT SERPL-MCNC: 0.8 MG/DL (ref 0.5–1.4)
EST. GFR  (NO RACE VARIABLE): >60 ML/MIN/1.73 M^2
GLUCOSE SERPL-MCNC: 107 MG/DL (ref 70–110)
MAGNESIUM SERPL-MCNC: 1.6 MG/DL (ref 1.6–2.6)
PHOSPHATE SERPL-MCNC: 1.3 MG/DL (ref 2.7–4.5)
POTASSIUM SERPL-SCNC: 3.3 MMOL/L (ref 3.5–5.1)
PROT SERPL-MCNC: 6.6 G/DL (ref 6–8.4)
SODIUM SERPL-SCNC: 141 MMOL/L (ref 136–145)

## 2022-09-22 PROCEDURE — 63600175 PHARM REV CODE 636 W HCPCS: Performed by: HOSPITALIST

## 2022-09-22 PROCEDURE — 11000001 HC ACUTE MED/SURG PRIVATE ROOM

## 2022-09-22 PROCEDURE — 84100 ASSAY OF PHOSPHORUS: CPT | Performed by: INTERNAL MEDICINE

## 2022-09-22 PROCEDURE — 25000003 PHARM REV CODE 250: Performed by: HOSPITALIST

## 2022-09-22 PROCEDURE — S5010 5% DEXTROSE AND 0.45% SALINE: HCPCS | Performed by: HOSPITALIST

## 2022-09-22 PROCEDURE — 36415 COLL VENOUS BLD VENIPUNCTURE: CPT | Performed by: INTERNAL MEDICINE

## 2022-09-22 PROCEDURE — 80053 COMPREHEN METABOLIC PANEL: CPT | Performed by: INTERNAL MEDICINE

## 2022-09-22 PROCEDURE — 83735 ASSAY OF MAGNESIUM: CPT | Performed by: INTERNAL MEDICINE

## 2022-09-22 PROCEDURE — 25000003 PHARM REV CODE 250: Performed by: INTERNAL MEDICINE

## 2022-09-22 RX ORDER — SODIUM,POTASSIUM PHOSPHATES 280-250MG
2 POWDER IN PACKET (EA) ORAL
Status: COMPLETED | OUTPATIENT
Start: 2022-09-22 | End: 2022-09-23

## 2022-09-22 RX ORDER — MAGNESIUM SULFATE HEPTAHYDRATE 40 MG/ML
2 INJECTION, SOLUTION INTRAVENOUS ONCE
Status: COMPLETED | OUTPATIENT
Start: 2022-09-22 | End: 2022-09-22

## 2022-09-22 RX ORDER — ALPRAZOLAM 0.25 MG/1
0.25 TABLET ORAL 2 TIMES DAILY PRN
Status: DISCONTINUED | OUTPATIENT
Start: 2022-09-22 | End: 2022-10-12 | Stop reason: HOSPADM

## 2022-09-22 RX ORDER — POTASSIUM CHLORIDE 20 MEQ/1
40 TABLET, EXTENDED RELEASE ORAL ONCE
Status: COMPLETED | OUTPATIENT
Start: 2022-09-22 | End: 2022-09-22

## 2022-09-22 RX ADMIN — DEXTROSE AND SODIUM CHLORIDE: 5; .45 INJECTION, SOLUTION INTRAVENOUS at 05:09

## 2022-09-22 RX ADMIN — ALPRAZOLAM 0.25 MG: 0.25 TABLET ORAL at 10:09

## 2022-09-22 RX ADMIN — POTASSIUM CHLORIDE 40 MEQ: 1500 TABLET, EXTENDED RELEASE ORAL at 04:09

## 2022-09-22 RX ADMIN — Medication 2 PACKET: at 08:09

## 2022-09-22 RX ADMIN — TERBINAFINE TABLETS 250 MG 250 MG: 250 TABLET ORAL at 08:09

## 2022-09-22 RX ADMIN — MAGNESIUM SULFATE HEPTAHYDRATE 2 G: 40 INJECTION, SOLUTION INTRAVENOUS at 04:09

## 2022-09-22 RX ADMIN — POLYETHYLENE GLYCOL 3350 17 G: 17 POWDER, FOR SOLUTION ORAL at 08:09

## 2022-09-22 RX ADMIN — THERA TABS 1 TABLET: TAB at 08:09

## 2022-09-22 RX ADMIN — Medication 2 PACKET: at 05:09

## 2022-09-22 NOTE — CONSULTS
"[facility] Surg  Wound Care    Patient Name:  Miguelito Landin   MRN:  [id number]  Date: 9/22/2022  Diagnosis: Dehydration with hypernatremia    History:     Past Medical History:   Diagnosis Date    Anxiety     CP (cerebral palsy)     Spasticity        Social History     Socioeconomic History    Marital status: Single   Tobacco Use    Smokeless tobacco: Never   Substance and Sexual Activity    Alcohol use: No    Drug use: No    Sexual activity: Never     Birth control/protection: None       Precautions:     Allergies as of 09/20/2022 - Reviewed 09/20/2022   Allergen Reaction Noted    Doxycycline Rash 09/02/2014       [facility] Assessment Details/Treatment   Consulted for foot rash and very long toenails  A 41 year old male admitted 9/20/22 from home with dehydration with hypernatremia; systemic inflammatory response syndrome; hypercalcemia; cerebral palsy  9/21 WBC 12.31 Hgb 15.3 Hct 44.8  9/22 Alb 3.7   On Isoflex mattress; Jonathan score 11; EHOB boots  Terbinafine HCL po ordered daily for 14 doses  Assessment:  Fingernails and toe nails average 2" long. Fingernails straight, but toenails curved with moderate amount of soft debris under nail.   Introduced self to patient and informed patient of plan to check nails and trim.   Patient became with nervous and eventually told me he was going to have a panic attack.   Spent time discussing sports with patient and placed television on [employer]. After 15 minutes of sitting with patient and discussing basketball and soccer. Patient allowed me to inspect feet.  Treatment:  Trimmed long nails and cleansed feet with bath wipes.   Moderate amount dry debris remained adherent to skin.   Patient did not want fingernails trimmed today, but states I can trim fingernails when I return on Monday.  Recommend continued cleansing of feet.     09/22/2022    "

## 2022-09-22 NOTE — PLAN OF CARE
Problem: Adult Inpatient Plan of Care  Goal: Plan of Care Review  Outcome: Ongoing, Progressing  Flowsheets (Taken 9/22/2022 1836)  Plan of Care Reviewed With: patient  Goal: Patient-Specific Goal (Individualized)  Outcome: Ongoing, Progressing  Goal: Absence of Hospital-Acquired Illness or Injury  Outcome: Ongoing, Progressing  Intervention: Identify and Manage Fall Risk  Flowsheets (Taken 9/22/2022 1836)  Safety Promotion/Fall Prevention:   assistive device/personal item within reach   high risk medications identified   nonskid shoes/socks when out of bed   side rails raised x 2   room near unit station   instructed to call staff for mobility   medications reviewed   Fall Risk reviewed with patient/family   bed alarm set  Intervention: Prevent Skin Injury  Flowsheets (Taken 9/22/2022 1836)  Body Position:   position changed independently   weight shifting  Skin Protection:   adhesive use limited   tubing/devices free from skin contact  Intervention: Prevent and Manage VTE (Venous Thromboembolism) Risk  Flowsheets (Taken 9/22/2022 1836)  Activity Management: Rolling - L1  VTE Prevention/Management:   bleeding precations maintained   bleeding risk assessed  Range of Motion: active ROM (range of motion) encouraged  Intervention: Prevent Infection  Flowsheets (Taken 9/22/2022 1836)  Infection Prevention: hand hygiene promoted  Goal: Optimal Comfort and Wellbeing  Outcome: Ongoing, Progressing  Goal: Readiness for Transition of Care  Outcome: Ongoing, Progressing     Problem: Infection  Goal: Absence of Infection Signs and Symptoms  Outcome: Ongoing, Progressing  Intervention: Prevent or Manage Infection  Flowsheets (Taken 9/22/2022 1836)  Infection Management: aseptic technique maintained     Problem: Skin Injury Risk Increased  Goal: Skin Health and Integrity  Outcome: Ongoing, Progressing  Intervention: Optimize Skin Protection  Flowsheets (Taken 9/22/2022 1836)  Pressure Reduction Techniques:   frequent weight  shift encouraged   pressure points protected   heels elevated off bed   weight shift assistance provided  Pressure Reduction Devices:   pressure-redistributing mattress utilized   heel offloading device utilized  Skin Protection:   adhesive use limited   tubing/devices free from skin contact  Head of Bed (HOB) Positioning: HOB at 30-45 degrees   Pt alert able to make needs known,heydi meds well,IV abx remains in progress,no s/s adverse reaction noted,reposition self q 2hrs,no c/o pain ,POC explained,remains free from falls and pressure injuries,safety maintained,continue monitoring.

## 2022-09-22 NOTE — NURSING
"Pt refused labs starting saying "panic attack my medina is gone I have nobody now who is going to help me"  notified.  "

## 2022-09-22 NOTE — ASSESSMENT & PLAN NOTE
Will see if wound care can trim his toenails which are thickened and digging into his foot skin. Podiatry cannot.   Started terbinafine x2 weeks for treatment

## 2022-09-22 NOTE — ASSESSMENT & PLAN NOTE
Consult social work for placement  Unsure if he has any family to care for him now that his father has - case management is working on this

## 2022-09-22 NOTE — PROGRESS NOTES
"[facility] Medicine  Progress Note    Patient Name: Miguelito Landin  MRN: [id number]  Patient Class: IP- Inpatient   Admission Date: 2022  Length of Stay: 1 days  Attending Physician: Maliha Chavarria MD  Primary Care Provider: Primary Doctor No        Subjective:     Principal Problem:Dehydration with hypernatremia        HPI:  Mr. Landin is a 40yo man with a past medical history of cerebral palsy with spasticity and anxiety.  At baseline, he is normally cared for by his father at home.  He is a poor historian and cannot state to me what happened today.    Dr. Spring, the ED staff, was able to gather some collateral from the EMS on arrival.  Apparently his father and he had not been seen for quite some time, so a care check was initiated.  On arrival, it was found that the patient's father had , and the patient was confined to his bed due to his mobility issues (bed-bound).  The last known contact with the patient and his father was 22.  In the interim, the patient has had no water or food, and had to languish in his own urine and stool until help arrived today.  The patient would not speak to me about his father's death despite my bringing it up directly.  All he will say repeatedly is, "something happened."  EMS did notify the patient on arrival that his father was , at which point he did become extremely upset.    In the ED his VS's were BP (!) 152/94   Pulse (!) 158 -> 127 -> 114   Temp 98 °F (36.7 °C)   Resp 19   Ht 6' (1.829 m)   Wt 68 kg (150 lb)   SpO2 (!) 94% RA  BMI 20.34 kg/m².  Labs showed WBC 18, Hg 19, HCT 54, .  , HCO3 14, BUN 17, Cr 1.2, AG 24.  Gluc 115, Ca 10.7, TB 2.4, normal LFT's.  CPK 43, LA 1.7, BHB 4.6.  COVID NEG. VBG pH 7.39, PCO2 30.     No radiographs were done in the ED.  (CXR now ordered and pending though).  EKG showed sinus tachycardia to 148 with MARYAM and non-specific ST changes.     In the ED he was treated with a " banana bag bolus , and NS 2L bolus .      Overview/Hospital Course:  Mr Miguelito Landin who is bedbound from cerebral palsy who was admitted with hypernatremia, hypercalcemia due to dehydration from lack of access to food/water after his caretaker/father . Electrolyte disturbances improved. Tolerating diet. Social work consulted for next of kin and plans for his care going forward.      Interval History: No complaints today. Nursing reports he had a panic attack about his father dying and no one to care for him.     Review of Systems   Constitutional:  Negative for chills and fever.   Respiratory:  Negative for shortness of breath.    Cardiovascular:  Negative for chest pain.   Gastrointestinal:  Negative for abdominal pain, constipation, diarrhea, nausea and vomiting.   Genitourinary:  Negative for difficulty urinating.   Neurological:  Positive for weakness.   Psychiatric/Behavioral:  Negative for confusion. The patient is nervous/anxious.    Objective:     Vital Signs (Most Recent):  Temp: 98.3 °F (36.8 °C) (22)  Pulse: 84 (22)  Resp: 18 (22)  BP: 124/87 (22)  SpO2: (!) 94 % (22)   Vital Signs (24h Range):  Temp:  [96.4 °F (35.8 °C)-98.3 °F (36.8 °C)] 98.3 °F (36.8 °C)  Pulse:  [] 84  Resp:  [16-21] 18  SpO2:  [93 %-100 %] 94 %  BP: (119-153)/(82-98) 124/87     Weight: 64.5 kg (142 lb 3.2 oz)  Body mass index is 19.29 kg/m².    Intake/Output Summary (Last 24 hours) at 2022 1036  Last data filed at 2022 0830  Gross per 24 hour   Intake 600 ml   Output 200 ml   Net 400 ml      Physical Exam  Vitals and nursing note reviewed.   Constitutional:       General: He is not in acute distress.     Appearance: He is not toxic-appearing.      Comments: Thin man   HENT:      Head: Normocephalic and atraumatic.      Nose: Nose normal.      Mouth/Throat:      Mouth: Mucous membranes are moist.   Eyes:      Conjunctiva/sclera: Conjunctivae  normal.   Cardiovascular:      Rate and Rhythm: Normal rate and regular rhythm.      Pulses: Normal pulses.      Heart sounds: Normal heart sounds.   Pulmonary:      Effort: Pulmonary effort is normal. No respiratory distress.      Breath sounds: No wheezing or rales.      Comments: Room air  Abdominal:      General: Bowel sounds are normal. There is no distension.      Palpations: Abdomen is soft.      Tenderness: There is no abdominal tenderness. There is no guarding.   Musculoskeletal:      Right lower leg: No edema.      Left lower leg: No edema.      Comments: Contractures of arms and legs. Muscular wasting of arms and legs   Skin:     Comments: Very long fingernails. Thickened long toenails. Thickened brown patches of skin on feet and between toes   Neurological:      Mental Status: He is alert. Mental status is at baseline.       Significant Labs: All pertinent labs within the past 24 hours have been reviewed.    Significant Imaging: I have reviewed all pertinent imaging results/findings within the past 24 hours.      Assessment/Plan:      * Dehydration with hypernatremia  Na 151 on admit due to dehydration from lack of PO access.  Resolved with IVF. Tolerating regular diet  DC IVF             Onychomycosis  Will see if wound care can trim his toenails which are thickened and digging into his foot skin. Podiatry cannot.   Started terbinafine x2 weeks for treatment       Tinea pedis of both feet  Started terbinafine x2 weeks  Check CMP      Hypophosphatemia  Replace and monitor  Monitor for refeeding      Cerebral palsy  Consult social work for placement  Unsure if he has any family to care for him now that his father has - case management is working on this      Hypercalcemia  Due severe dehydration. Resolved with IVF        SIRS (systemic inflammatory response syndrome)  This was most likely from dehydration and has now resolved. No signs of infection          VTE Risk Mitigation (From admission, onward)          Ordered     IP VTE LOW RISK PATIENT  Once         09/21/22 0433     Place sequential compression device  Until discontinued         09/21/22 0433     Place DAPHNE hose  Until discontinued         09/21/22 0433                Discharge Planning   RADHA:      Code Status: Full Code   Is the patient medically ready for discharge?:     Reason for patient still in hospital (select all that apply): Patient trending condition  Discharge Plan A: Other (TBD)                  Maliha Chavarria MD  Department of [facility] Medicine   [facility] Surg

## 2022-09-22 NOTE — HOSPITAL COURSE
"Mr Miguelito Landin who is bedbound from cerebral palsy who was admitted with hypernatremia, hypercalcemia due to dehydration from lack of access to food/water after his caretaker/father . Electrolyte disturbances improved. Tolerating diet. He is reasonably having stress and anxiety related to this; PRN xanax available. Social work consulted for locating his next of kin (both parents , other family is in [facility]) and plans for his care going forward.  Patient became placement issue. Of note, tried to initiate DVT prophylaxis but patient refuses because needles causes him a lot of stress and anxiety. Discussed risks of possible DVT given bedbound status. Patient responds "okay". Medically ready to go, Awaiting placement. CM/SW assisting with placement and discharge disposition.   "

## 2022-09-22 NOTE — NURSING
"Pt states "at home I take Klonopin when I need it but at home I never get panis attacks why am I getting them here" emotion support provided,will continue to monitor.   "

## 2022-09-22 NOTE — SUBJECTIVE & OBJECTIVE
Interval History: No complaints today. Nursing reports he had a panic attack about his father dying and no one to care for him.     Review of Systems   Constitutional:  Negative for chills and fever.   Respiratory:  Negative for shortness of breath.    Cardiovascular:  Negative for chest pain.   Gastrointestinal:  Negative for abdominal pain, constipation, diarrhea, nausea and vomiting.   Genitourinary:  Negative for difficulty urinating.   Neurological:  Positive for weakness.   Psychiatric/Behavioral:  Negative for confusion. The patient is nervous/anxious.    Objective:     Vital Signs (Most Recent):  Temp: 98.3 °F (36.8 °C) (09/22/22 0752)  Pulse: 84 (09/22/22 0752)  Resp: 18 (09/22/22 0752)  BP: 124/87 (09/22/22 0752)  SpO2: (!) 94 % (09/22/22 0752)   Vital Signs (24h Range):  Temp:  [96.4 °F (35.8 °C)-98.3 °F (36.8 °C)] 98.3 °F (36.8 °C)  Pulse:  [] 84  Resp:  [16-21] 18  SpO2:  [93 %-100 %] 94 %  BP: (119-153)/(82-98) 124/87     Weight: 64.5 kg (142 lb 3.2 oz)  Body mass index is 19.29 kg/m².    Intake/Output Summary (Last 24 hours) at 9/22/2022 1036  Last data filed at 9/22/2022 0830  Gross per 24 hour   Intake 600 ml   Output 200 ml   Net 400 ml      Physical Exam  Vitals and nursing note reviewed.   Constitutional:       General: He is not in acute distress.     Appearance: He is not toxic-appearing.      Comments: Thin man   HENT:      Head: Normocephalic and atraumatic.      Nose: Nose normal.      Mouth/Throat:      Mouth: Mucous membranes are moist.   Eyes:      Conjunctiva/sclera: Conjunctivae normal.   Cardiovascular:      Rate and Rhythm: Normal rate and regular rhythm.      Pulses: Normal pulses.      Heart sounds: Normal heart sounds.   Pulmonary:      Effort: Pulmonary effort is normal. No respiratory distress.      Breath sounds: No wheezing or rales.      Comments: Room air  Abdominal:      General: Bowel sounds are normal. There is no distension.      Palpations: Abdomen is soft.       Tenderness: There is no abdominal tenderness. There is no guarding.   Musculoskeletal:      Right lower leg: No edema.      Left lower leg: No edema.      Comments: Contractures of arms and legs. Muscular wasting of arms and legs   Skin:     Comments: Very long fingernails. Thickened long toenails. Thickened brown patches of skin on feet and between toes   Neurological:      Mental Status: He is alert. Mental status is at baseline.       Significant Labs: All pertinent labs within the past 24 hours have been reviewed.    Significant Imaging: I have reviewed all pertinent imaging results/findings within the past 24 hours.

## 2022-09-22 NOTE — ASSESSMENT & PLAN NOTE
Na 151 on admit due to dehydration from lack of PO access.  Resolved with IVF. Tolerating regular diet  DC IVF

## 2022-09-23 LAB
ALBUMIN SERPL BCP-MCNC: 3.9 G/DL (ref 3.5–5.2)
ALP SERPL-CCNC: 80 U/L (ref 55–135)
ALT SERPL W/O P-5'-P-CCNC: 13 U/L (ref 10–44)
ANION GAP SERPL CALC-SCNC: 11 MMOL/L (ref 8–16)
AST SERPL-CCNC: 12 U/L (ref 10–40)
BILIRUB SERPL-MCNC: 1.3 MG/DL (ref 0.1–1)
BUN SERPL-MCNC: 5 MG/DL (ref 6–20)
CALCIUM SERPL-MCNC: 8.9 MG/DL (ref 8.7–10.5)
CHLORIDE SERPL-SCNC: 109 MMOL/L (ref 95–110)
CO2 SERPL-SCNC: 21 MMOL/L (ref 23–29)
CREAT SERPL-MCNC: 0.7 MG/DL (ref 0.5–1.4)
EST. GFR  (NO RACE VARIABLE): >60 ML/MIN/1.73 M^2
GLUCOSE SERPL-MCNC: 86 MG/DL (ref 70–110)
MAGNESIUM SERPL-MCNC: 1.9 MG/DL (ref 1.6–2.6)
PHOSPHATE SERPL-MCNC: 2.3 MG/DL (ref 2.7–4.5)
POTASSIUM SERPL-SCNC: 3.5 MMOL/L (ref 3.5–5.1)
PROT SERPL-MCNC: 6.9 G/DL (ref 6–8.4)
SODIUM SERPL-SCNC: 141 MMOL/L (ref 136–145)

## 2022-09-23 PROCEDURE — 84100 ASSAY OF PHOSPHORUS: CPT | Performed by: INTERNAL MEDICINE

## 2022-09-23 PROCEDURE — 11000001 HC ACUTE MED/SURG PRIVATE ROOM

## 2022-09-23 PROCEDURE — 80053 COMPREHEN METABOLIC PANEL: CPT | Performed by: INTERNAL MEDICINE

## 2022-09-23 PROCEDURE — 36415 COLL VENOUS BLD VENIPUNCTURE: CPT | Performed by: INTERNAL MEDICINE

## 2022-09-23 PROCEDURE — 94761 N-INVAS EAR/PLS OXIMETRY MLT: CPT

## 2022-09-23 PROCEDURE — 83735 ASSAY OF MAGNESIUM: CPT | Performed by: INTERNAL MEDICINE

## 2022-09-23 PROCEDURE — 25000003 PHARM REV CODE 250: Performed by: INTERNAL MEDICINE

## 2022-09-23 PROCEDURE — 25000003 PHARM REV CODE 250: Performed by: HOSPITALIST

## 2022-09-23 RX ADMIN — TERBINAFINE TABLETS 250 MG 250 MG: 250 TABLET ORAL at 08:09

## 2022-09-23 RX ADMIN — Medication 2 PACKET: at 06:09

## 2022-09-23 RX ADMIN — POLYETHYLENE GLYCOL 3350 17 G: 17 POWDER, FOR SOLUTION ORAL at 08:09

## 2022-09-23 RX ADMIN — THERA TABS 1 TABLET: TAB at 08:09

## 2022-09-23 RX ADMIN — Medication 2 PACKET: at 11:09

## 2022-09-23 NOTE — ASSESSMENT & PLAN NOTE
Na 151 on admit due to dehydration from lack of PO access.  Resolved with IVF. Tolerating regular diet  DC IVF       Resolved

## 2022-09-23 NOTE — PLAN OF CARE
Recommendations    1) SLP to evaluate for chewing/Swallowing abilities and texture appropriateness  2) Continue Regular Diet - texture per SLP, encourage PO, Fluid consumption  3) Addition of Boost (+) TID to assist in meeting pt increased energy needs  4) Monitor Nutrition related labs    Goals:   1) Pt to meet adequate PO/Fluid intake by RD follow up  2) Monitored labs to trend toward target ranges    Nutrition Goal Status: new  Communication of RD Recs:  (POC)

## 2022-09-23 NOTE — PROGRESS NOTES
" followed up with family friend, Julisa Montano, [phone], to determine if she had been able to share hospital's contact number with the family's .  Ms. Montano, stated that she had not received a call back from the  but provided contact information for the firm.  Ms. Montano was also able to provide names of patient's mother's cousins but did not have contact numbers for them.  Also provided was name and telephone number of the father's niece's  in [facility].  Ms. Daniels reported that patient had previously received mental health treatment at [facility].  She stated that when the clinic discontinued Clonapine for the patient, the father stopped taking the patient to the clinic.  She also stated that the patient's behavior was sometimes difficult for the father to manage.  She stated that the patient's father also had a history of mental illness.    Mr. Miguelito Landin  Sr. ():  Relatives:  Jody Cordova:  Telephone number: [phone] ([facility]) : First Cousin's .    Mrs. Megan Landin ():   Relatives :  Avni Michel, first cousin. Llives in [place].  No contact number available.   "Festus" Marilu, first cousin. Llives in [facility].  No contact number available.    :  Jack Butler, Atty: [phone] (Office is located in [facility]).      "

## 2022-09-23 NOTE — SUBJECTIVE & OBJECTIVE
Interval History: No new issues     Review of Systems   Constitutional:  Positive for activity change. Negative for appetite change, chills, diaphoresis and fatigue.   HENT:  Negative for congestion and dental problem.    Eyes:  Negative for discharge and itching.   Respiratory:  Negative for apnea and chest tightness.    Cardiovascular:  Negative for chest pain and leg swelling.   Gastrointestinal:  Negative for abdominal distention and abdominal pain.   Endocrine: Negative for cold intolerance.   Genitourinary:  Negative for difficulty urinating and dysuria.   Musculoskeletal:  Negative for arthralgias and back pain.   Neurological:  Negative for dizziness.   Psychiatric/Behavioral:  Negative for agitation and behavioral problems.    Objective:     Vital Signs (Most Recent):  Temp: 98.3 °F (36.8 °C) (09/23/22 0723)  Pulse: 87 (09/23/22 0723)  Resp: 16 (09/23/22 0723)  BP: 124/76 (09/23/22 0723)  SpO2: (!) 94 % (09/23/22 0723)   Vital Signs (24h Range):  Temp:  [97.9 °F (36.6 °C)-98.3 °F (36.8 °C)] 98.3 °F (36.8 °C)  Pulse:  [] 87  Resp:  [16-20] 16  SpO2:  [94 %-98 %] 94 %  BP: (124-141)/(76-95) 124/76     Weight: 64.5 kg (142 lb 3.2 oz)  Body mass index is 19.29 kg/m².    Intake/Output Summary (Last 24 hours) at 9/23/2022 0904  Last data filed at 9/23/2022 0611  Gross per 24 hour   Intake 600 ml   Output 2200 ml   Net -1600 ml      Physical Exam  Vitals and nursing note reviewed.   Constitutional:       General: He is not in acute distress.     Appearance: He is ill-appearing. He is not toxic-appearing.   HENT:      Head: Normocephalic and atraumatic.   Eyes:      Conjunctiva/sclera: Conjunctivae normal.   Cardiovascular:      Rate and Rhythm: Normal rate and regular rhythm.   Pulmonary:      Effort: Pulmonary effort is normal. No respiratory distress.   Abdominal:      General: There is no distension.   Skin:     General: Skin is warm and dry.   Neurological:      Mental Status: He is alert and oriented to  person, place, and time.   Psychiatric:         Mood and Affect: Mood normal.         Behavior: Behavior normal.       Significant Labs: All pertinent labs within the past 24 hours have been reviewed.  BMP:   Recent Labs   Lab 09/23/22  0403   GLU 86      K 3.5      CO2 21*   BUN 5*   CREATININE 0.7   CALCIUM 8.9   MG 1.9     CBC: No results for input(s): WBC, HGB, HCT, PLT in the last 48 hours.    Significant Imaging:

## 2022-09-23 NOTE — CONSULTS
[facility] - Med Surg  Adult Nutrition  Consult Note    SUMMARY   Continue Regular Diet as tolerated  RD addition of Boost (+) TID  LBM 9/21 - Addition of Bowel regimen   No other new recommendations at this time.      Recommendations    1) SLP to evaluate for chewing/Swallowing abilities and texture appropriateness  2) Continue Regular Diet - texture per SLP, encourage PO, Fluid consumption  3) Addition of Boost (+) TID to assist in meeting pt increased energy needs  4) Monitor Nutrition related labs    Goals:   1) Pt to meet adequate PO/Fluid intake by RD follow up  2) Monitored labs to trend toward target ranges    Nutrition Goal Status: progressing towards goal  Communication of RD Recs:  (POC)    Assessment and Plan  Nutrition Problem  Increased Energy Needs    Related to (etiology):   Cerebral Palsy    Signs and Symptoms (as evidenced by):   Muscle wasting & contractures, SQ fat loss    Interventions/Recommendations (treatment strategy):  [employer]  Collaboration of care with other providers    Nutrition Diagnosis Status:   New      Malnutrition Assessment  Pt Declined NFPE at time of Visit - Visual NFPE findings,   information gathered via chart review below.    Malnutrition Type: acute illness or injury, chronic illness  Energy Intake: severe energy intake  Nails (Micronutrient):  (Long and unkept)  Teeth (Micronutrient): carries  Neck/Chest (Micronutrient): muscle wasting, subcutaneous fat loss  Musculoskeletal/Lower Extremities: muscle wasting, subcutaneous fat loss       Weight Loss (Malnutrition):  (WILLARD - No weight hx, pt poor historian)  Energy Intake (Malnutrition): less than 75% for greater than 7 days  Subcutaneous Fat (Malnutrition): moderate depletion  Muscle Mass (Malnutrition): moderate depletion       Worship Region (Muscle Loss): moderate depletion  Clavicle and Acromion Bone Region (Muscle Loss): moderate depletion  Dorsal Hand (Muscle Loss): severe depletion               Reason for  Assessment    Reason For Assessment: RD follow up  Diagnosis:  (Dehydration with Hypernatremia)  Relevant Medical History:   Patient Active Problem List   Diagnosis    Dehydration with hypernatremia    SIRS (systemic inflammatory response syndrome)    Hypercalcemia    Cerebral palsy    Hypophosphatemia    Tinea pedis of both feet    Onychomycosis     General Information Comments:      - PO intake improved 75 - 100%, dehydration resolved via IVF, hypophosphatemia - replete PRN. No current signs of refeeding, will continue to monitor.     - Pt presented to ED via EMS after wellness check at pt home. Pt father and pt had not been seen for several days - upon check of home pt father and primary caregiver was found to be . Pt is bed bound 2' cerebral palsy and during this time was without access to food and water. Upon arrival pt was dehydrated and hypernatremic - provided with IVF and derangements improved. % of meals thus far. Will continue to monitor.     Nutrition Risk Screen    Nutrition Risk Screen: no indicators present    Nutrition/Diet History    Food Allergies: NKFA  Factors Affecting Nutritional Intake: inability to feed self, impaired cognitive status/motor control    Anthropometrics    Temp: 98.2 °F (36.8 °C)  Height Method: Estimated  Height: 6' (182.9 cm)  Height (inches): 72 in  Weight Method: Bed Scale  Weight: 64.5 kg (142 lb 3.2 oz)  Weight (lb): 142.2 lb  Ideal Body Weight (IBW), Male: 178 lb  % Ideal Body Weight, Male (lb): 79.89 %  BMI (Calculated): 19.3  BMI Grade: 18.5-24.9 - normal       Lab/Procedures/Meds    Pertinent Labs Reviewed: reviewed  CBC:  No results for input(s): WBC, HGB, HCT, PLT in the last 24 hours.  CMP:  No results for input(s): GLUCOSE, CALCIUM, ALBUMIN, PROT, NA, K, CO2, CL, BUN, CREATININE, ALKPHOS, ALT, AST, BILITOT in the last 24 hours.    Pertinent Medications Reviewed: reviewed  Scheduled Meds:   amLODIPine  10 mg Oral Daily    multivitamin  1 tablet  Oral Daily    polyethylene glycol  17 g Oral Daily    terbinafine HCL  250 mg Oral Daily     Continuous Infusions:  PRN Meds:.acetaminophen, ALPRAZolam, aluminum-magnesium hydroxide-simethicone, dextrose 50%, dextrose 50%, glucagon (human recombinant), glucose, glucose, melatonin, naloxone, ondansetron, prochlorperazine, senna-docusate 8.6-50 mg, simethicone, sodium chloride 0.9%    Physical Findings/Assessment  Pt is ill appearing, thin, male with limb contractures 2' Cerebral Palsy. Both Fingernails and toenails averaging ~ 2 in. Long. Poor maintenance and hygiene upon arrival d/t circumstances of situation at time of pt discovery.    Estimated/Assessed Needs    Weight Used For Calorie Calculations: 64.5 kg (142 lb 3.2 oz)  Energy Calorie Requirements (kcal): 2258 - 2580  Energy Need Method:  (35 - 40 per malnutrition, CP; Spastic)  Protein Requirements: 97g (1.5g/kg)  Weight Used For Protein Calculations: 64.5 kg (142 lb 3.2 oz)  Fluid Requirements (mL): 1 mL/kcal  Estimated Fluid Requirement Method:  (or per MD)  RDA Method (mL): 2258  CHO Requirement: 282g      Nutrition Prescription Ordered    Current Diet Order: Regular    Evaluation of Received Nutrient/Fluid Intake    I/O: - 1.3 L since admit  Energy Calories Required: meeting needs  Protein Required: meeting needs  Fluid Required: meeting needs  Comments: LBM 9/21  % Intake of Estimated Energy Needs: 75 - 100 %  % Meal Intake: 75 - 100 %    Intake/Output - Last 3 Shifts         09/25 0700 09/26 0659 09/26 0700 09/27 0659    P.O. 720 960    Total Intake(mL/kg) 720 (11.2) 960 (14.9)    Urine (mL/kg/hr) 250 (0.2) 1200 (1.3)    Stool  0    Total Output 250 1200    Net +470 -240          Urine Occurrence 451 x 2 x    Stool Occurrence  1 x            Nutrition Risk    Level of Risk/Frequency of Follow-up:  (1 - 2 x/week)       Monitor and Evaluation    Food and Nutrient Intake: energy intake  Food and Nutrient Adminstration: diet order  Physical Activity and  Function: nutrition-related ADLs and IADLs  Anthropometric Measurements: weight, weight change  Biochemical Data, Medical Tests and Procedures: electrolyte and renal panel, gastrointestinal profile, glucose/endocrine profile, inflammatory profile, lipid profile  Nutrition-Focused Physical Findings: overall appearance       Nutrition Follow-Up    RD Follow-up?: Yes  Cindy Avila, BS, RDN, LDN

## 2022-09-23 NOTE — PROGRESS NOTES
"[facility] Medicine  Progress Note    Patient Name: Miguelito Landin  MRN: [id number]  Patient Class: IP- Inpatient   Admission Date: 2022  Length of Stay: 2 days  Attending Physician: Blade Santana MD  Primary Care Provider: Primary Doctor No        Subjective:     Principal Problem:Dehydration with hypernatremia        HPI:  Mr. Landin is a 42yo man with a past medical history of cerebral palsy with spasticity and anxiety.  At baseline, he is normally cared for by his father at home.  He is a poor historian and cannot state to me what happened today.    Dr. Spring, the ED staff, was able to gather some collateral from the EMS on arrival.  Apparently his father and he had not been seen for quite some time, so a care check was initiated.  On arrival, it was found that the patient's father had , and the patient was confined to his bed due to his mobility issues (bed-bound).  The last known contact with the patient and his father was 22.  In the interim, the patient has had no water or food, and had to languish in his own urine and stool until help arrived today.  The patient would not speak to me about his father's death despite my bringing it up directly.  All he will say repeatedly is, "something happened."  EMS did notify the patient on arrival that his father was , at which point he did become extremely upset.    In the ED his VS's were BP (!) 152/94   Pulse (!) 158 -> 127 -> 114   Temp 98 °F (36.7 °C)   Resp 19   Ht 6' (1.829 m)   Wt 68 kg (150 lb)   SpO2 (!) 94% RA  BMI 20.34 kg/m².  Labs showed WBC 18, Hg 19, HCT 54, .  , HCO3 14, BUN 17, Cr 1.2, AG 24.  Gluc 115, Ca 10.7, TB 2.4, normal LFT's.  CPK 43, LA 1.7, BHB 4.6.  COVID NEG. VBG pH 7.39, PCO2 30.     No radiographs were done in the ED.  (CXR now ordered and pending though).  EKG showed sinus tachycardia to 148 with MARYAM and non-specific ST changes.     In the ED he was treated with " a banana bag bolus , and NS 2L bolus .      Overview/Hospital Course:  Mr Miguelito Landin who is bedbound from cerebral palsy who was admitted with hypernatremia, hypercalcemia due to dehydration from lack of access to food/water after his caretaker/father . Electrolyte disturbances improved. Tolerating diet. He is reasonably having stress and anxiety related to this; PRN xanax available. Social work consulted for locating his next of kin (both parents , other family is in [facility]) and plans for his care going forward.      Interval History: No new issues     Review of Systems   Constitutional:  Positive for activity change. Negative for appetite change, chills, diaphoresis and fatigue.   HENT:  Negative for congestion and dental problem.    Eyes:  Negative for discharge and itching.   Respiratory:  Negative for apnea and chest tightness.    Cardiovascular:  Negative for chest pain and leg swelling.   Gastrointestinal:  Negative for abdominal distention and abdominal pain.   Endocrine: Negative for cold intolerance.   Genitourinary:  Negative for difficulty urinating and dysuria.   Musculoskeletal:  Negative for arthralgias and back pain.   Neurological:  Negative for dizziness.   Psychiatric/Behavioral:  Negative for agitation and behavioral problems.    Objective:     Vital Signs (Most Recent):  Temp: 98.3 °F (36.8 °C) (22)  Pulse: 87 (22)  Resp: 16 (22)  BP: 124/76 (22)  SpO2: (!) 94 % (22)   Vital Signs (24h Range):  Temp:  [97.9 °F (36.6 °C)-98.3 °F (36.8 °C)] 98.3 °F (36.8 °C)  Pulse:  [] 87  Resp:  [16-20] 16  SpO2:  [94 %-98 %] 94 %  BP: (124-141)/(76-95) 124/76     Weight: 64.5 kg (142 lb 3.2 oz)  Body mass index is 19.29 kg/m².    Intake/Output Summary (Last 24 hours) at 2022 0904  Last data filed at 2022 0611  Gross per 24 hour   Intake 600 ml   Output 2200 ml   Net -1600 ml      Physical Exam  Vitals and  nursing note reviewed.   Constitutional:       General: He is not in acute distress.     Appearance: He is ill-appearing. He is not toxic-appearing.   HENT:      Head: Normocephalic and atraumatic.   Eyes:      Conjunctiva/sclera: Conjunctivae normal.   Cardiovascular:      Rate and Rhythm: Normal rate and regular rhythm.   Pulmonary:      Effort: Pulmonary effort is normal. No respiratory distress.   Abdominal:      General: There is no distension.   Skin:     General: Skin is warm and dry.   Neurological:      Mental Status: He is alert and oriented to person, place, and time.   Psychiatric:         Mood and Affect: Mood normal.         Behavior: Behavior normal.       Significant Labs: All pertinent labs within the past 24 hours have been reviewed.  BMP:   Recent Labs   Lab 22  0403   GLU 86      K 3.5      CO2 21*   BUN 5*   CREATININE 0.7   CALCIUM 8.9   MG 1.9     CBC: No results for input(s): WBC, HGB, HCT, PLT in the last 48 hours.    Significant Imaging:       Assessment/Plan:      * Dehydration with hypernatremia  Na 151 on admit due to dehydration from lack of PO access.  Resolved with IVF. Tolerating regular diet  DC IVF       Resolved           Onychomycosis  Will see if wound care can trim his toenails which are thickened and digging into his foot skin. Podiatry cannot.   Started terbinafine x2 weeks for treatment       Tinea pedis of both feet  Started terbinafine x2 weeks  Check CMP      Hypophosphatemia  Replace and monitor  Monitor for refeeding      Cerebral palsy  Consult social work for placement  Unsure if he has any family to care for him now that his father has - case management is working on this      Hypercalcemia  Due severe dehydration. Resolved with IVF        SIRS (systemic inflammatory response syndrome)  This was most likely from dehydration and has now resolved. No signs of infection          VTE Risk Mitigation (From admission, onward)         Ordered     IP  VTE LOW RISK PATIENT  Once         09/21/22 0433     Place sequential compression device  Until discontinued         09/21/22 0433     Place DAPHNE hose  Until discontinued         09/21/22 0433                Discharge Planning   RADHA:      Code Status: Full Code   Is the patient medically ready for discharge?:     Reason for patient still in hospital (select all that apply): Patient unstable  Discharge Plan A: Other (TBD)                  Blade Jones MD  Department of Hospital Medicine   [facility]

## 2022-09-24 PROCEDURE — 25000003 PHARM REV CODE 250: Performed by: HOSPITALIST

## 2022-09-24 PROCEDURE — 25000003 PHARM REV CODE 250: Performed by: INTERNAL MEDICINE

## 2022-09-24 PROCEDURE — 11000001 HC ACUTE MED/SURG PRIVATE ROOM

## 2022-09-24 RX ADMIN — POLYETHYLENE GLYCOL 3350 17 G: 17 POWDER, FOR SOLUTION ORAL at 08:09

## 2022-09-24 RX ADMIN — TERBINAFINE TABLETS 250 MG 250 MG: 250 TABLET ORAL at 08:09

## 2022-09-24 RX ADMIN — THERA TABS 1 TABLET: TAB at 08:09

## 2022-09-24 NOTE — SUBJECTIVE & OBJECTIVE
Interval History:  No new issues     Review of Systems   Constitutional:  Positive for activity change. Negative for appetite change, chills, diaphoresis and fatigue.   HENT:  Negative for congestion and dental problem.    Eyes:  Negative for discharge and itching.   Respiratory:  Negative for apnea and chest tightness.    Cardiovascular:  Negative for chest pain and leg swelling.   Gastrointestinal:  Negative for abdominal distention and abdominal pain.   Endocrine: Negative for cold intolerance.   Genitourinary:  Negative for difficulty urinating and dysuria.   Musculoskeletal:  Negative for arthralgias and back pain.   Neurological:  Negative for dizziness.   Psychiatric/Behavioral:  Negative for agitation and behavioral problems.    Objective:     Vital Signs (Most Recent):  Temp: 97.9 °F (36.6 °C) (09/24/22 0413)  Pulse: 83 (09/24/22 0413)  Resp: 18 (09/24/22 0413)  BP: (!) 138/95 (09/24/22 0413)  SpO2: 98 % (09/24/22 0413)   Vital Signs (24h Range):  Temp:  [97.6 °F (36.4 °C)-98.9 °F (37.2 °C)] 97.9 °F (36.6 °C)  Pulse:  [70-91] 83  Resp:  [16-18] 18  SpO2:  [93 %-98 %] 98 %  BP: (116-138)/(76-95) 138/95     Weight: 64.5 kg (142 lb 3.2 oz)  Body mass index is 19.29 kg/m².    Intake/Output Summary (Last 24 hours) at 9/24/2022 0609  Last data filed at 9/24/2022 0500  Gross per 24 hour   Intake 720 ml   Output 2200 ml   Net -1480 ml      Physical Exam  Vitals and nursing note reviewed.   Constitutional:       General: He is not in acute distress.     Appearance: He is ill-appearing. He is not toxic-appearing.   HENT:      Head: Normocephalic and atraumatic.   Eyes:      Conjunctiva/sclera: Conjunctivae normal.   Cardiovascular:      Rate and Rhythm: Normal rate and regular rhythm.   Pulmonary:      Effort: Pulmonary effort is normal. No respiratory distress.   Abdominal:      General: There is no distension.   Skin:     General: Skin is warm and dry.   Neurological:      Mental Status: He is alert and oriented to  person, place, and time.   Psychiatric:         Mood and Affect: Mood normal.         Behavior: Behavior normal.       Significant Labs: All pertinent labs within the past 24 hours have been reviewed.  BMP:   Recent Labs   Lab 09/23/22  0403   GLU 86      K 3.5      CO2 21*   BUN 5*   CREATININE 0.7   CALCIUM 8.9   MG 1.9     CBC: No results for input(s): WBC, HGB, HCT, PLT in the last 48 hours.    Significant Imaging:

## 2022-09-24 NOTE — PLAN OF CARE
Problem: Adult Inpatient Plan of Care  Goal: Plan of Care Review  Outcome: Ongoing, Progressing  Goal: Optimal Comfort and Wellbeing  Outcome: Ongoing, Progressing  Goal: Readiness for Transition of Care  Outcome: Ongoing, Progressing     Problem: Skin Injury Risk Increased  Goal: Skin Health and Integrity  Outcome: Ongoing, Progressing

## 2022-09-24 NOTE — PLAN OF CARE
Problem: Adult Inpatient Plan of Care  Goal: Plan of Care Review  Outcome: Ongoing, Progressing  Flowsheets (Taken 9/24/2022 1627)  Plan of Care Reviewed With: patient  Goal: Patient-Specific Goal (Individualized)  Outcome: Ongoing, Progressing  Goal: Absence of Hospital-Acquired Illness or Injury  Outcome: Ongoing, Progressing  Intervention: Identify and Manage Fall Risk  Flowsheets (Taken 9/24/2022 1627)  Safety Promotion/Fall Prevention:   assistive device/personal item within reach   Fall Risk reviewed with patient/family   high risk medications identified   medications reviewed   side rails raised x 2   nonskid shoes/socks when out of bed   room near unit station   instructed to call staff for mobility  Intervention: Prevent Skin Injury  Flowsheets (Taken 9/24/2022 1627)  Body Position:   position changed independently   weight shifting  Skin Protection:   adhesive use limited   tubing/devices free from skin contact  Intervention: Prevent and Manage VTE (Venous Thromboembolism) Risk  Flowsheets (Taken 9/24/2022 1627)  Activity Management: Rolling - L1  VTE Prevention/Management:   bleeding precations maintained   bleeding risk assessed  Intervention: Prevent Infection  Flowsheets (Taken 9/24/2022 1627)  Infection Prevention: hand hygiene promoted  Goal: Optimal Comfort and Wellbeing  Outcome: Ongoing, Progressing  Goal: Readiness for Transition of Care  Outcome: Ongoing, Progressing     Problem: Infection  Goal: Absence of Infection Signs and Symptoms  Outcome: Ongoing, Progressing  Intervention: Prevent or Manage Infection  Flowsheets (Taken 9/24/2022 1627)  Infection Management: aseptic technique maintained     Problem: Skin Injury Risk Increased  Goal: Skin Health and Integrity  Outcome: Ongoing, Progressing  Intervention: Optimize Skin Protection  Flowsheets (Taken 9/24/2022 1627)  Pressure Reduction Techniques:   frequent weight shift encouraged   weight shift assistance provided   heels elevated off  bed   pressure points protected  Pressure Reduction Devices:   heel offloading device utilized   foam padding utilized   positioning supports utilized  Skin Protection:   adhesive use limited   tubing/devices free from skin contact  Head of Bed (HOB) Positioning: HOB at 30-45 degrees   Pt alert able to make needs known,heydi meds well,no s/s adverse reaction noted,reposition self q 2hrs,denies pain ,POC explained,remains free from falls and pressure injuries,safety maintained,continue monitoring.

## 2022-09-24 NOTE — PROGRESS NOTES
"[facility] Medicine  Progress Note    Patient Name: Miguelito Landin  MRN: [id number]  Patient Class: IP- Inpatient   Admission Date: 2022  Length of Stay: 3 days  Attending Physician: Blade Santana MD  Primary Care Provider: Primary Doctor No        Subjective:     Principal Problem:Dehydration with hypernatremia        HPI:  Mr. Landin is a 40yo man with a past medical history of cerebral palsy with spasticity and anxiety.  At baseline, he is normally cared for by his father at home.  He is a poor historian and cannot state to me what happened today.    Dr. Spring, the ED staff, was able to gather some collateral from the EMS on arrival.  Apparently his father and he had not been seen for quite some time, so a care check was initiated.  On arrival, it was found that the patient's father had , and the patient was confined to his bed due to his mobility issues (bed-bound).  The last known contact with the patient and his father was 22.  In the interim, the patient has had no water or food, and had to languish in his own urine and stool until help arrived today.  The patient would not speak to me about his father's death despite my bringing it up directly.  All he will say repeatedly is, "something happened."  EMS did notify the patient on arrival that his father was , at which point he did become extremely upset.    In the ED his VS's were BP (!) 152/94   Pulse (!) 158 -> 127 -> 114   Temp 98 °F (36.7 °C)   Resp 19   Ht 6' (1.829 m)   Wt 68 kg (150 lb)   SpO2 (!) 94% RA  BMI 20.34 kg/m².  Labs showed WBC 18, Hg 19, HCT 54, .  , HCO3 14, BUN 17, Cr 1.2, AG 24.  Gluc 115, Ca 10.7, TB 2.4, normal LFT's.  CPK 43, LA 1.7, BHB 4.6.  COVID NEG. VBG pH 7.39, PCO2 30.     No radiographs were done in the ED.  (CXR now ordered and pending though).  EKG showed sinus tachycardia to 148 with MARYAM and non-specific ST changes.     In the ED he was treated with " a banana bag bolus , and NS 2L bolus .      Overview/Hospital Course:  Mr Miguelito Landin who is bedbound from cerebral palsy who was admitted with hypernatremia, hypercalcemia due to dehydration from lack of access to food/water after his caretaker/father . Electrolyte disturbances improved. Tolerating diet. He is reasonably having stress and anxiety related to this; PRN xanax available. Social work consulted for locating his next of kin (both parents , other family is in [facility]) and plans for his care going forward.      Interval History:  No new issues     Review of Systems   Constitutional:  Positive for activity change. Negative for appetite change, chills, diaphoresis and fatigue.   HENT:  Negative for congestion and dental problem.    Eyes:  Negative for discharge and itching.   Respiratory:  Negative for apnea and chest tightness.    Cardiovascular:  Negative for chest pain and leg swelling.   Gastrointestinal:  Negative for abdominal distention and abdominal pain.   Endocrine: Negative for cold intolerance.   Genitourinary:  Negative for difficulty urinating and dysuria.   Musculoskeletal:  Negative for arthralgias and back pain.   Neurological:  Negative for dizziness.   Psychiatric/Behavioral:  Negative for agitation and behavioral problems.    Objective:     Vital Signs (Most Recent):  Temp: 97.9 °F (36.6 °C) (22)  Pulse: 83 (22)  Resp: 18 (22)  BP: (!) 138/95 (22)  SpO2: 98 % (22)   Vital Signs (24h Range):  Temp:  [97.6 °F (36.4 °C)-98.9 °F (37.2 °C)] 97.9 °F (36.6 °C)  Pulse:  [70-91] 83  Resp:  [16-18] 18  SpO2:  [93 %-98 %] 98 %  BP: (116-138)/(76-95) 138/95     Weight: 64.5 kg (142 lb 3.2 oz)  Body mass index is 19.29 kg/m².    Intake/Output Summary (Last 24 hours) at 2022 0609  Last data filed at 2022 0500  Gross per 24 hour   Intake 720 ml   Output 2200 ml   Net -1480 ml      Physical Exam  Vitals and  nursing note reviewed.   Constitutional:       General: He is not in acute distress.     Appearance: He is ill-appearing. He is not toxic-appearing.   HENT:      Head: Normocephalic and atraumatic.   Eyes:      Conjunctiva/sclera: Conjunctivae normal.   Cardiovascular:      Rate and Rhythm: Normal rate and regular rhythm.   Pulmonary:      Effort: Pulmonary effort is normal. No respiratory distress.   Abdominal:      General: There is no distension.   Skin:     General: Skin is warm and dry.   Neurological:      Mental Status: He is alert and oriented to person, place, and time.   Psychiatric:         Mood and Affect: Mood normal.         Behavior: Behavior normal.       Significant Labs: All pertinent labs within the past 24 hours have been reviewed.  BMP:   Recent Labs   Lab 22  0403   GLU 86      K 3.5      CO2 21*   BUN 5*   CREATININE 0.7   CALCIUM 8.9   MG 1.9     CBC: No results for input(s): WBC, HGB, HCT, PLT in the last 48 hours.    Significant Imaging:       Assessment/Plan:      * Dehydration with hypernatremia  Na 151 on admit due to dehydration from lack of PO access.  Resolved with IVF. Tolerating regular diet  DC IVF       Resolved           Onychomycosis  Will see if wound care can trim his toenails which are thickened and digging into his foot skin. Podiatry cannot.   Started terbinafine x2 weeks for treatment       Tinea pedis of both feet  Started terbinafine x2 weeks  Check CMP      Hypophosphatemia  Replace and monitor  Monitor for refeeding      Cerebral palsy  Consult social work for placement  Unsure if he has any family to care for him now that his father has - case management is working on this      Hypercalcemia  Due severe dehydration. Resolved with IVF        SIRS (systemic inflammatory response syndrome)  This was most likely from dehydration and has now resolved. No signs of infection          VTE Risk Mitigation (From admission, onward)         Ordered     IP  VTE LOW RISK PATIENT  Once         09/21/22 0433     Place sequential compression device  Until discontinued         09/21/22 0433     Place DAPHNE hose  Until discontinued         09/21/22 0433                Discharge Planning   RADHA:      Code Status: Full Code   Is the patient medically ready for discharge?:     Reason for patient still in hospital (select all that apply):   Discharge Plan A: Other (TBD)                  Blade Jones MD  Department of Hospital Medicine   [facility]

## 2022-09-25 PROCEDURE — 25000003 PHARM REV CODE 250: Performed by: INTERNAL MEDICINE

## 2022-09-25 PROCEDURE — 25000003 PHARM REV CODE 250: Performed by: HOSPITALIST

## 2022-09-25 PROCEDURE — 11000001 HC ACUTE MED/SURG PRIVATE ROOM

## 2022-09-25 RX ORDER — AMLODIPINE BESYLATE 5 MG/1
10 TABLET ORAL DAILY
Status: DISCONTINUED | OUTPATIENT
Start: 2022-09-25 | End: 2022-10-10

## 2022-09-25 RX ADMIN — TERBINAFINE TABLETS 250 MG 250 MG: 250 TABLET ORAL at 08:09

## 2022-09-25 RX ADMIN — POLYETHYLENE GLYCOL 3350 17 G: 17 POWDER, FOR SOLUTION ORAL at 08:09

## 2022-09-25 RX ADMIN — AMLODIPINE BESYLATE 10 MG: 5 TABLET ORAL at 08:09

## 2022-09-25 RX ADMIN — THERA TABS 1 TABLET: TAB at 08:09

## 2022-09-25 NOTE — SUBJECTIVE & OBJECTIVE
Interval History:  No new issues     Review of Systems   Constitutional:  Positive for activity change. Negative for appetite change, chills, diaphoresis and fatigue.   HENT:  Negative for congestion and dental problem.    Eyes:  Negative for discharge and itching.   Respiratory:  Negative for apnea and chest tightness.    Cardiovascular:  Negative for chest pain and leg swelling.   Gastrointestinal:  Negative for abdominal distention and abdominal pain.   Endocrine: Negative for cold intolerance.   Genitourinary:  Negative for difficulty urinating and dysuria.   Musculoskeletal:  Negative for arthralgias and back pain.   Neurological:  Negative for dizziness.   Psychiatric/Behavioral:  Negative for agitation and behavioral problems.    Objective:     Vital Signs (Most Recent):  Temp: 97.6 °F (36.4 °C) (09/25/22 0806)  Pulse: 84 (09/25/22 0806)  Resp: 14 (09/25/22 0806)  BP: (!) 161/95 (09/25/22 0806)  SpO2: (!) 94 % (09/25/22 0806)   Vital Signs (24h Range):  Temp:  [97.2 °F (36.2 °C)-98 °F (36.7 °C)] 97.6 °F (36.4 °C)  Pulse:  [] 84  Resp:  [14-18] 14  SpO2:  [94 %-97 %] 94 %  BP: (131-161)/(86-99) 161/95     Weight: 64.5 kg (142 lb 3.2 oz)  Body mass index is 19.29 kg/m².    Intake/Output Summary (Last 24 hours) at 9/25/2022 0843  Last data filed at 9/25/2022 0400  Gross per 24 hour   Intake 600 ml   Output 700 ml   Net -100 ml      Physical Exam  Vitals and nursing note reviewed.   Constitutional:       General: He is not in acute distress.     Appearance: He is ill-appearing. He is not toxic-appearing.   HENT:      Head: Normocephalic and atraumatic.   Eyes:      Conjunctiva/sclera: Conjunctivae normal.   Cardiovascular:      Rate and Rhythm: Normal rate and regular rhythm.   Pulmonary:      Effort: Pulmonary effort is normal. No respiratory distress.   Abdominal:      General: There is no distension.   Skin:     General: Skin is warm and dry.   Neurological:      Mental Status: He is alert and oriented  to person, place, and time.   Psychiatric:         Mood and Affect: Mood normal.         Behavior: Behavior normal.       Significant Labs: All pertinent labs within the past 24 hours have been reviewed.  BMP: No results for input(s): GLU, NA, K, CL, CO2, BUN, CREATININE, CALCIUM, MG in the last 48 hours.  CBC: No results for input(s): WBC, HGB, HCT, PLT in the last 48 hours.    Significant Imaging:

## 2022-09-25 NOTE — PROGRESS NOTES
"[facility] Medicine  Progress Note    Patient Name: Miguelito Landin  MRN: [id number]  Patient Class: IP- Inpatient   Admission Date: 2022  Length of Stay: 4 days  Attending Physician: Blade Santana MD  Primary Care Provider: Primary Doctor No        Subjective:     Principal Problem:Dehydration with hypernatremia        HPI:  Mr. Landin is a 42yo man with a past medical history of cerebral palsy with spasticity and anxiety.  At baseline, he is normally cared for by his father at home.  He is a poor historian and cannot state to me what happened today.    Dr. Spring, the ED staff, was able to gather some collateral from the EMS on arrival.  Apparently his father and he had not been seen for quite some time, so a care check was initiated.  On arrival, it was found that the patient's father had , and the patient was confined to his bed due to his mobility issues (bed-bound).  The last known contact with the patient and his father was 22.  In the interim, the patient has had no water or food, and had to languish in his own urine and stool until help arrived today.  The patient would not speak to me about his father's death despite my bringing it up directly.  All he will say repeatedly is, "something happened."  EMS did notify the patient on arrival that his father was , at which point he did become extremely upset.    In the ED his VS's were BP (!) 152/94   Pulse (!) 158 -> 127 -> 114   Temp 98 °F (36.7 °C)   Resp 19   Ht 6' (1.829 m)   Wt 68 kg (150 lb)   SpO2 (!) 94% RA  BMI 20.34 kg/m².  Labs showed WBC 18, Hg 19, HCT 54, .  , HCO3 14, BUN 17, Cr 1.2, AG 24.  Gluc 115, Ca 10.7, TB 2.4, normal LFT's.  CPK 43, LA 1.7, BHB 4.6.  COVID NEG. VBG pH 7.39, PCO2 30.     No radiographs were done in the ED.  (CXR now ordered and pending though).  EKG showed sinus tachycardia to 148 with MARYAM and non-specific ST changes.     In the ED he was treated with " a banana bag bolus , and NS 2L bolus .      Overview/Hospital Course:  Mr Miguelito Landin who is bedbound from cerebral palsy who was admitted with hypernatremia, hypercalcemia due to dehydration from lack of access to food/water after his caretaker/father . Electrolyte disturbances improved. Tolerating diet. He is reasonably having stress and anxiety related to this; PRN xanax available. Social work consulted for locating his next of kin (both parents , other family is in [facility]) and plans for his care going forward.      Interval History:  No new issues     Review of Systems   Constitutional:  Positive for activity change. Negative for appetite change, chills, diaphoresis and fatigue.   HENT:  Negative for congestion and dental problem.    Eyes:  Negative for discharge and itching.   Respiratory:  Negative for apnea and chest tightness.    Cardiovascular:  Negative for chest pain and leg swelling.   Gastrointestinal:  Negative for abdominal distention and abdominal pain.   Endocrine: Negative for cold intolerance.   Genitourinary:  Negative for difficulty urinating and dysuria.   Musculoskeletal:  Negative for arthralgias and back pain.   Neurological:  Negative for dizziness.   Psychiatric/Behavioral:  Negative for agitation and behavioral problems.    Objective:     Vital Signs (Most Recent):  Temp: 97.6 °F (36.4 °C) (22)  Pulse: 84 (22)  Resp: 14 (22)  BP: (!) 161/95 (22)  SpO2: (!) 94 % (22)   Vital Signs (24h Range):  Temp:  [97.2 °F (36.2 °C)-98 °F (36.7 °C)] 97.6 °F (36.4 °C)  Pulse:  [] 84  Resp:  [14-18] 14  SpO2:  [94 %-97 %] 94 %  BP: (131-161)/(86-99) 161/95     Weight: 64.5 kg (142 lb 3.2 oz)  Body mass index is 19.29 kg/m².    Intake/Output Summary (Last 24 hours) at 2022 0843  Last data filed at 2022 0400  Gross per 24 hour   Intake 600 ml   Output 700 ml   Net -100 ml      Physical Exam  Vitals and  nursing note reviewed.   Constitutional:       General: He is not in acute distress.     Appearance: He is ill-appearing. He is not toxic-appearing.   HENT:      Head: Normocephalic and atraumatic.   Eyes:      Conjunctiva/sclera: Conjunctivae normal.   Cardiovascular:      Rate and Rhythm: Normal rate and regular rhythm.   Pulmonary:      Effort: Pulmonary effort is normal. No respiratory distress.   Abdominal:      General: There is no distension.   Skin:     General: Skin is warm and dry.   Neurological:      Mental Status: He is alert and oriented to person, place, and time.   Psychiatric:         Mood and Affect: Mood normal.         Behavior: Behavior normal.       Significant Labs: All pertinent labs within the past 24 hours have been reviewed.  BMP: No results for input(s): GLU, NA, K, CL, CO2, BUN, CREATININE, CALCIUM, MG in the last 48 hours.  CBC: No results for input(s): WBC, HGB, HCT, PLT in the last 48 hours.    Significant Imaging:      Assessment/Plan:      * Dehydration with hypernatremia  Na 151 on admit due to dehydration from lack of PO access.  Resolved with IVF. Tolerating regular diet  DC IVF       Resolved           Onychomycosis  Will see if wound care can trim his toenails which are thickened and digging into his foot skin. Podiatry cannot.   Started terbinafine x2 weeks for treatment       Tinea pedis of both feet  Started terbinafine x2 weeks  Check CMP      Hypophosphatemia  Replace and monitor  Monitor for refeeding      Cerebral palsy  Consult social work for placement  Unsure if he has any family to care for him now that his father has - case management is working on this      Hypercalcemia  Due severe dehydration. Resolved with IVF        SIRS (systemic inflammatory response syndrome)  This was most likely from dehydration and has now resolved. No signs of infection          VTE Risk Mitigation (From admission, onward)         Ordered     IP VTE LOW RISK PATIENT  Once          09/21/22 0433     Place sequential compression device  Until discontinued         09/21/22 0433     Place DAPHNE hose  Until discontinued         09/21/22 0433                Discharge Planning   RADHA:      Code Status: Full Code   Is the patient medically ready for discharge?:     Reason for patient still in hospital (select all that apply): patient stable.  Discharge Plan A: Other (TBD)        Placement issue        Blade Jones MD  Department [facility]   [facility]

## 2022-09-25 NOTE — NURSING
0400: Patient vomit small amount of undigested food. Denies any nausea. No s/s of distress noted. Will continue to monitor.

## 2022-09-25 NOTE — PLAN OF CARE
Problem: Adult Inpatient Plan of Care  Goal: Plan of Care Review  Outcome: Ongoing, Progressing  Flowsheets (Taken 9/25/2022 1656)  Plan of Care Reviewed With: patient  Goal: Patient-Specific Goal (Individualized)  Outcome: Ongoing, Progressing  Goal: Absence of Hospital-Acquired Illness or Injury  Outcome: Ongoing, Progressing  Goal: Optimal Comfort and Wellbeing  Outcome: Ongoing, Progressing  Goal: Readiness for Transition of Care  Outcome: Ongoing, Progressing     Problem: Infection  Goal: Absence of Infection Signs and Symptoms  Outcome: Ongoing, Progressing  Intervention: Prevent or Manage Infection  Flowsheets (Taken 9/25/2022 1656)  Infection Management: aseptic technique maintained     Problem: Skin Injury Risk Increased  Goal: Skin Health and Integrity  Outcome: Ongoing, Progressing  Intervention: Optimize Skin Protection  Flowsheets (Taken 9/25/2022 1656)  Pressure Reduction Techniques:   pressure points protected   weight shift assistance provided   frequent weight shift encouraged  Pressure Reduction Devices:   foam padding utilized   heel offloading device utilized   positioning supports utilized  Skin Protection:   adhesive use limited   tubing/devices free from skin contact  Head of Bed (HOB) Positioning: HOB at 30-45 degrees   Pt alert able to make needs known,heydi meds well,no s/s adverse reaction noted,reposition q 2hrs,pt denies pain ,POC explained,remains free from falls and pressure injuries,safety maintained,continue monitoring.

## 2022-09-25 NOTE — PLAN OF CARE
Problem: Adult Inpatient Plan of Care  Goal: Plan of Care Review  Outcome: Ongoing, Progressing  Goal: Patient-Specific Goal (Individualized)  Outcome: Ongoing, Progressing  Goal: Absence of Hospital-Acquired Illness or Injury  Outcome: Ongoing, Progressing  Goal: Optimal Comfort and Wellbeing  Outcome: Ongoing, Progressing     Problem: Infection  Goal: Absence of Infection Signs and Symptoms  Outcome: Ongoing, Progressing     Problem: Skin Injury Risk Increased  Goal: Skin Health and Integrity  Outcome: Ongoing, Progressing     POC reviewed with patient. Patient oriented to self and place. No evidence of learning on education. Fall/safety precautions implemented and maintained. Condom catheter care performed. No acute events noted this shift. Please see flowsheet for full assessment and vitals. Bed locked in lowest position. Call bell within reach. Will continue to monitor.

## 2022-09-26 PROCEDURE — 11000001 HC ACUTE MED/SURG PRIVATE ROOM

## 2022-09-26 PROCEDURE — 25000003 PHARM REV CODE 250: Performed by: INTERNAL MEDICINE

## 2022-09-26 PROCEDURE — 25000003 PHARM REV CODE 250: Performed by: HOSPITALIST

## 2022-09-26 RX ADMIN — THERA TABS 1 TABLET: TAB at 08:09

## 2022-09-26 RX ADMIN — TERBINAFINE TABLETS 250 MG 250 MG: 250 TABLET ORAL at 08:09

## 2022-09-26 RX ADMIN — POLYETHYLENE GLYCOL 3350 17 G: 17 POWDER, FOR SOLUTION ORAL at 08:09

## 2022-09-26 RX ADMIN — AMLODIPINE BESYLATE 10 MG: 5 TABLET ORAL at 08:09

## 2022-09-26 NOTE — PROGRESS NOTES
"Sticky note placed on the chart with contact number(s) for "family"  Vandana and Williams Michel.  (H) [phone]   (C) [phone].  JEREMIAH was able to speak with Mrs. Michel.  According to Mrs. Michel, her  was the first cousin of the patient's mother, Megan Landin.  She stated that the patient's father's family is in [facility] and the grandparents are believed to still be alive. She stated that  friend(s), Silvino or John Delgado, maybe able to provide more information.  Ms. Michel did not have contact numbers for either indivdual.  She stated that contact information for iSlvino might be contained in the police report as she was told he had gone to the home to visit then called police. She stated the Mr. Delgado was an  in the [place] Mrs. Michel  stated that she would try to speak with other individuals who might be able to provide  with more information.      Mrs. Michel was provided with this 's name and contact number.  JEREMIAH gave Mrs. Michel permission to share number with others who might be able to assist in search for relatives.      "

## 2022-09-26 NOTE — PROGRESS NOTES
"[facility] Medicine  Progress Note    Patient Name: Miguelito Landin  MRN: [id number]  Patient Class: IP- Inpatient   Admission Date: 2022  Length of Stay: 5 days  Attending Physician: Blade Santana MD  Primary Care Provider: Primary Doctor No        Subjective:     Principal Problem:Dehydration with hypernatremia        HPI:  Mr. Landin is a 42yo man with a past medical history of cerebral palsy with spasticity and anxiety.  At baseline, he is normally cared for by his father at home.  He is a poor historian and cannot state to me what happened today.    Dr. Spring, the ED staff, was able to gather some collateral from the EMS on arrival.  Apparently his father and he had not been seen for quite some time, so a care check was initiated.  On arrival, it was found that the patient's father had , and the patient was confined to his bed due to his mobility issues (bed-bound).  The last known contact with the patient and his father was 22.  In the interim, the patient has had no water or food, and had to languish in his own urine and stool until help arrived today.  The patient would not speak to me about his father's death despite my bringing it up directly.  All he will say repeatedly is, "something happened."  EMS did notify the patient on arrival that his father was , at which point he did become extremely upset.    In the ED his VS's were BP (!) 152/94   Pulse (!) 158 -> 127 -> 114   Temp 98 °F (36.7 °C)   Resp 19   Ht 6' (1.829 m)   Wt 68 kg (150 lb)   SpO2 (!) 94% RA  BMI 20.34 kg/m².  Labs showed WBC 18, Hg 19, HCT 54, .  , HCO3 14, BUN 17, Cr 1.2, AG 24.  Gluc 115, Ca 10.7, TB 2.4, normal LFT's.  CPK 43, LA 1.7, BHB 4.6.  COVID NEG. VBG pH 7.39, PCO2 30.     No radiographs were done in the ED.  (CXR now ordered and pending though).  EKG showed sinus tachycardia to 148 with MARYAM and non-specific ST changes.     In the ED he was treated with " a banana bag bolus , and NS 2L bolus .      Overview/Hospital Course:  Mr Miguelito Landin who is bedbound from cerebral palsy who was admitted with hypernatremia, hypercalcemia due to dehydration from lack of access to food/water after his caretaker/father . Electrolyte disturbances improved. Tolerating diet. He is reasonably having stress and anxiety related to this; PRN xanax available. Social work consulted for locating his next of kin (both parents , other family is in [facility]) and plans for his care going forward.      Interval History:  No new issues     Review of Systems   Constitutional:  Positive for activity change. Negative for appetite change, chills, diaphoresis and fatigue.   HENT:  Negative for congestion and dental problem.    Eyes:  Negative for discharge and itching.   Respiratory:  Negative for apnea and chest tightness.    Cardiovascular:  Negative for chest pain and leg swelling.   Gastrointestinal:  Negative for abdominal distention and abdominal pain.   Endocrine: Negative for cold intolerance.   Genitourinary:  Negative for difficulty urinating and dysuria.   Musculoskeletal:  Negative for arthralgias and back pain.   Neurological:  Negative for dizziness.   Psychiatric/Behavioral:  Negative for agitation and behavioral problems.    Objective:     Vital Signs (Most Recent):  Temp: 98.2 °F (36.8 °C) (22 08)  Pulse: 110 (22 08)  Resp: 18 (22 08)  BP: 127/82 (22 0807)  SpO2: (!) 94 % (22)   Vital Signs (24h Range):  Temp:  [97.4 °F (36.3 °C)-98.9 °F (37.2 °C)] 98.2 °F (36.8 °C)  Pulse:  [] 110  Resp:  [14-19] 18  SpO2:  [92 %-95 %] 94 %  BP: (127-168)/(82-99) 127/82     Weight: 64.5 kg (142 lb 3.2 oz)  Body mass index is 19.29 kg/m².    Intake/Output Summary (Last 24 hours) at 2022 0919  Last data filed at 2022 0830  Gross per 24 hour   Intake 840 ml   Output 250 ml   Net 590 ml      Physical Exam  Vitals and  nursing note reviewed.   Constitutional:       General: He is not in acute distress.     Appearance: He is ill-appearing. He is not toxic-appearing.   HENT:      Head: Normocephalic and atraumatic.   Eyes:      Conjunctiva/sclera: Conjunctivae normal.   Cardiovascular:      Rate and Rhythm: Normal rate and regular rhythm.   Pulmonary:      Effort: Pulmonary effort is normal. No respiratory distress.   Abdominal:      General: There is no distension.   Skin:     General: Skin is warm and dry.   Neurological:      Mental Status: He is alert and oriented to person, place, and time.   Psychiatric:         Mood and Affect: Mood normal.         Behavior: Behavior normal.       Significant Labs: All pertinent labs within the past 24 hours have been reviewed.  BMP: No results for input(s): GLU, NA, K, CL, CO2, BUN, CREATININE, CALCIUM, MG in the last 48 hours.  CBC: No results for input(s): WBC, HGB, HCT, PLT in the last 48 hours.    Significant Imaging:       Assessment/Plan:      * Dehydration with hypernatremia  Na 151 on admit due to dehydration from lack of PO access.  Resolved with IVF. Tolerating regular diet  DC IVF       Resolved           Onychomycosis  Will see if wound care can trim his toenails which are thickened and digging into his foot skin. Podiatry cannot.   Started terbinafine x2 weeks for treatment       Tinea pedis of both feet  Started terbinafine x2 weeks  Check CMP      Hypophosphatemia  Replace and monitor  Monitor for refeeding      Cerebral palsy  Consult social work for placement  Unsure if he has any family to care for him now that his father has - case management is working on this      Hypercalcemia  Due severe dehydration. Resolved with IVF        SIRS (systemic inflammatory response syndrome)  This was most likely from dehydration and has now resolved. No signs of infection          VTE Risk Mitigation (From admission, onward)           Ordered     IP VTE LOW RISK PATIENT  Once          09/21/22 0433     Place sequential compression device  Until discontinued         09/21/22 0433     Place DAPHNE hose  Until discontinued         09/21/22 0433                    Discharge Planning   RADHA:      Code Status: Full Code   Is the patient medically ready for discharge?:     Reason for patient still in hospital (select all that apply):  Discharge Plan A: Other (TBD)        Placement issue. Patient lacks capacity to make his own medical decisions.           Blade Jones MD  Department of Hospital Medicine   [facility] Surg

## 2022-09-26 NOTE — PLAN OF CARE
Problem: Adult Inpatient Plan of Care  Goal: Plan of Care Review  Outcome: Ongoing, Progressing  Flowsheets (Taken 9/26/2022 1219)  Plan of Care Reviewed With: patient  Goal: Patient-Specific Goal (Individualized)  Outcome: Ongoing, Progressing  Goal: Absence of Hospital-Acquired Illness or Injury  Outcome: Ongoing, Progressing  Intervention: Identify and Manage Fall Risk  Flowsheets (Taken 9/26/2022 1219)  Safety Promotion/Fall Prevention:   assistive device/personal item within reach   Fall Risk reviewed with patient/family   high risk medications identified   side rails raised x 2   nonskid shoes/socks when out of bed   room near unit station   bed alarm set   /camera at bedside   instructed to call staff for mobility   medications reviewed  Intervention: Prevent Skin Injury  Flowsheets (Taken 9/26/2022 1219)  Body Position:   turned   weight shifting  Skin Protection:   adhesive use limited   tubing/devices free from skin contact  Intervention: Prevent and Manage VTE (Venous Thromboembolism) Risk  Flowsheets (Taken 9/26/2022 1219)  Activity Management: Rolling - L1  VTE Prevention/Management:   bleeding risk assessed   bleeding precations maintained  Intervention: Prevent Infection  Flowsheets (Taken 9/26/2022 1219)  Infection Prevention: hand hygiene promoted  Goal: Optimal Comfort and Wellbeing  Outcome: Ongoing, Progressing  Goal: Readiness for Transition of Care  Outcome: Ongoing, Progressing     Problem: Infection  Goal: Absence of Infection Signs and Symptoms  Outcome: Ongoing, Progressing  Intervention: Prevent or Manage Infection  Flowsheets (Taken 9/26/2022 1219)  Infection Management: aseptic technique maintained     Problem: Skin Injury Risk Increased  Goal: Skin Health and Integrity  Outcome: Ongoing, Progressing  Intervention: Optimize Skin Protection  Flowsheets (Taken 9/26/2022 1219)  Pressure Reduction Techniques: frequent weight shift encouraged  Skin Protection:   adhesive use  limited   tubing/devices free from skin contact  Head of Bed (HOB) Positioning: HOB at 30-45 degrees

## 2022-09-27 PROCEDURE — 25000003 PHARM REV CODE 250: Performed by: INTERNAL MEDICINE

## 2022-09-27 PROCEDURE — 25000003 PHARM REV CODE 250: Performed by: HOSPITALIST

## 2022-09-27 PROCEDURE — 11000001 HC ACUTE MED/SURG PRIVATE ROOM

## 2022-09-27 RX ADMIN — AMLODIPINE BESYLATE 10 MG: 5 TABLET ORAL at 10:09

## 2022-09-27 RX ADMIN — SENNOSIDES AND DOCUSATE SODIUM 1 TABLET: 50; 8.6 TABLET ORAL at 10:09

## 2022-09-27 RX ADMIN — THERA TABS 1 TABLET: TAB at 10:09

## 2022-09-27 RX ADMIN — TERBINAFINE TABLETS 250 MG 250 MG: 250 TABLET ORAL at 10:09

## 2022-09-27 NOTE — NURSING
Bedside shift report received from night shift nurse.  Patient denies any needs or wants at this time.  Call light within reach.  Will continue to monitor.

## 2022-09-27 NOTE — PROGRESS NOTES
"[facility] Medicine  Progress Note    Patient Name: Miguelito Landin  MRN: [id number]  Patient Class: IP- Inpatient   Admission Date: 2022  Length of Stay: 6 days  Attending Physician: Blade Santana MD  Primary Care Provider: Primary Doctor No        Subjective:     Principal Problem:Dehydration with hypernatremia        HPI:  Mr. Landin is a 42yo man with a past medical history of cerebral palsy with spasticity and anxiety.  At baseline, he is normally cared for by his father at home.  He is a poor historian and cannot state to me what happened today.    Dr. Spring, the ED staff, was able to gather some collateral from the EMS on arrival.  Apparently his father and he had not been seen for quite some time, so a care check was initiated.  On arrival, it was found that the patient's father had , and the patient was confined to his bed due to his mobility issues (bed-bound).  The last known contact with the patient and his father was 22.  In the interim, the patient has had no water or food, and had to languish in his own urine and stool until help arrived today.  The patient would not speak to me about his father's death despite my bringing it up directly.  All he will say repeatedly is, "something happened."  EMS did notify the patient on arrival that his father was , at which point he did become extremely upset.    In the ED his VS's were BP (!) 152/94   Pulse (!) 158 -> 127 -> 114   Temp 98 °F (36.7 °C)   Resp 19   Ht 6' (1.829 m)   Wt 68 kg (150 lb)   SpO2 (!) 94% RA  BMI 20.34 kg/m².  Labs showed WBC 18, Hg 19, HCT 54, .  , HCO3 14, BUN 17, Cr 1.2, AG 24.  Gluc 115, Ca 10.7, TB 2.4, normal LFT's.  CPK 43, LA 1.7, BHB 4.6.  COVID NEG. VBG pH 7.39, PCO2 30.     No radiographs were done in the ED.  (CXR now ordered and pending though).  EKG showed sinus tachycardia to 148 with MARYAM and non-specific ST changes.     In the ED he was treated with " a banana bag bolus , and NS 2L bolus .      Overview/Hospital Course:  Mr Miguelito Landin who is bedbound from cerebral palsy who was admitted with hypernatremia, hypercalcemia due to dehydration from lack of access to food/water after his caretaker/father . Electrolyte disturbances improved. Tolerating diet. He is reasonably having stress and anxiety related to this; PRN xanax available. Social work consulted for locating his next of kin (both parents , other family is in [facility]) and plans for his care going forward.  Patient became placement issue       Interval History: No new issues     Review of Systems   Constitutional:  Positive for activity change. Negative for appetite change, chills, diaphoresis and fatigue.   HENT:  Negative for congestion and dental problem.    Eyes:  Negative for discharge and itching.   Respiratory:  Negative for apnea and chest tightness.    Cardiovascular:  Negative for chest pain and leg swelling.   Gastrointestinal:  Negative for abdominal distention and abdominal pain.   Endocrine: Negative for cold intolerance.   Genitourinary:  Negative for difficulty urinating and dysuria.   Musculoskeletal:  Negative for arthralgias and back pain.   Neurological:  Negative for dizziness.   Psychiatric/Behavioral:  Negative for agitation and behavioral problems.    Objective:     Vital Signs (Most Recent):  Temp: 98.8 °F (37.1 °C) (22)  Pulse: 101 (22)  Resp: 16 (22)  BP: 125/75 (22)  SpO2: (!) 94 % (22)   Vital Signs (24h Range):  Temp:  [98 °F (36.7 °C)-99.2 °F (37.3 °C)] 98.8 °F (37.1 °C)  Pulse:  [] 101  Resp:  [16-18] 16  SpO2:  [94 %-98 %] 94 %  BP: (112-139)/(66-91) 125/75     Weight: 64.5 kg (142 lb 3.2 oz)  Body mass index is 19.29 kg/m².    Intake/Output Summary (Last 24 hours) at 2022 0858  Last data filed at 2022 0600  Gross per 24 hour   Intake 720 ml   Output 1550 ml   Net -830 ml       Physical Exam  Vitals and nursing note reviewed.   Constitutional:       General: He is not in acute distress.     Appearance: He is ill-appearing. He is not toxic-appearing.   HENT:      Head: Normocephalic and atraumatic.   Eyes:      Conjunctiva/sclera: Conjunctivae normal.   Cardiovascular:      Rate and Rhythm: Normal rate and regular rhythm.   Pulmonary:      Effort: Pulmonary effort is normal. No respiratory distress.   Abdominal:      General: There is no distension.   Skin:     General: Skin is warm and dry.   Neurological:      Mental Status: He is alert and oriented to person, place, and time.   Psychiatric:         Mood and Affect: Mood normal.         Behavior: Behavior normal.       Significant Labs: All pertinent labs within the past 24 hours have been reviewed.  BMP: No results for input(s): GLU, NA, K, CL, CO2, BUN, CREATININE, CALCIUM, MG in the last 48 hours.  CBC: No results for input(s): WBC, HGB, HCT, PLT in the last 48 hours.    Significant Imaging:       Assessment/Plan:      * Dehydration with hypernatremia  Na 151 on admit due to dehydration from lack of PO access.  Resolved with IVF. Tolerating regular diet  DC IVF       Resolved           Onychomycosis  Will see if wound care can trim his toenails which are thickened and digging into his foot skin. Podiatry cannot.   Started terbinafine x2 weeks for treatment       Tinea pedis of both feet  Started terbinafine x2 weeks  Check CMP      Hypophosphatemia  Replace and monitor  Monitor for refeeding      Cerebral palsy  Consult social work for placement  Unsure if he has any family to care for him now that his father has - case management is working on this      Hypercalcemia  Due severe dehydration. Resolved with IVF        SIRS (systemic inflammatory response syndrome)  This was most likely from dehydration and has now resolved. No signs of infection          VTE Risk Mitigation (From admission, onward)         Ordered     IP VTE LOW  RISK PATIENT  Once         09/21/22 0433     Place sequential compression device  Until discontinued         09/21/22 0433     Place DAPHNE hose  Until discontinued         09/21/22 0433                Discharge Planning   RADHA:      Code Status: Full Code   Is the patient medically ready for discharge?:     Reason for patient still in hospital (select all that apply):   Discharge Plan A: Other (TBD)        Placement issue        Blade Jones MD  Department of Hospital Medicine   [facility]

## 2022-09-27 NOTE — SUBJECTIVE & OBJECTIVE
Interval History: No new issues     Review of Systems   Constitutional:  Positive for activity change. Negative for appetite change, chills, diaphoresis and fatigue.   HENT:  Negative for congestion and dental problem.    Eyes:  Negative for discharge and itching.   Respiratory:  Negative for apnea and chest tightness.    Cardiovascular:  Negative for chest pain and leg swelling.   Gastrointestinal:  Negative for abdominal distention and abdominal pain.   Endocrine: Negative for cold intolerance.   Genitourinary:  Negative for difficulty urinating and dysuria.   Musculoskeletal:  Negative for arthralgias and back pain.   Neurological:  Negative for dizziness.   Psychiatric/Behavioral:  Negative for agitation and behavioral problems.    Objective:     Vital Signs (Most Recent):  Temp: 98.8 °F (37.1 °C) (09/27/22 0730)  Pulse: 101 (09/27/22 0730)  Resp: 16 (09/27/22 0730)  BP: 125/75 (09/27/22 0730)  SpO2: (!) 94 % (09/27/22 0730)   Vital Signs (24h Range):  Temp:  [98 °F (36.7 °C)-99.2 °F (37.3 °C)] 98.8 °F (37.1 °C)  Pulse:  [] 101  Resp:  [16-18] 16  SpO2:  [94 %-98 %] 94 %  BP: (112-139)/(66-91) 125/75     Weight: 64.5 kg (142 lb 3.2 oz)  Body mass index is 19.29 kg/m².    Intake/Output Summary (Last 24 hours) at 9/27/2022 0858  Last data filed at 9/27/2022 0600  Gross per 24 hour   Intake 720 ml   Output 1550 ml   Net -830 ml      Physical Exam  Vitals and nursing note reviewed.   Constitutional:       General: He is not in acute distress.     Appearance: He is ill-appearing. He is not toxic-appearing.   HENT:      Head: Normocephalic and atraumatic.   Eyes:      Conjunctiva/sclera: Conjunctivae normal.   Cardiovascular:      Rate and Rhythm: Normal rate and regular rhythm.   Pulmonary:      Effort: Pulmonary effort is normal. No respiratory distress.   Abdominal:      General: There is no distension.   Skin:     General: Skin is warm and dry.   Neurological:      Mental Status: He is alert and oriented to  person, place, and time.   Psychiatric:         Mood and Affect: Mood normal.         Behavior: Behavior normal.       Significant Labs: All pertinent labs within the past 24 hours have been reviewed.  BMP: No results for input(s): GLU, NA, K, CL, CO2, BUN, CREATININE, CALCIUM, MG in the last 48 hours.  CBC: No results for input(s): WBC, HGB, HCT, PLT in the last 48 hours.    Significant Imaging:

## 2022-09-27 NOTE — PLAN OF CARE
[facility] Surg  Discharge Reassessment    Primary Care Provider: Primary Doctor No    Expected Discharge Date:     Reassessment (most recent)       Discharge Reassessment - 22 0959          Discharge Reassessment    Assessment Type Discharge Planning Reassessment     Did the patient's condition or plan change since previous assessment? Yes   Patient is medically stable for discharge.    Discharge Plan discussed with: --   Other:  Attempting to locate family.    Communicated RADHA with patient/caregiver Other (see comments)   Attempting to locate family.    Discharge Plan A Other   TBD    DME Needed Upon Discharge  other (see comments)   Might benefit from PT/OT eval for DME recommendations.    Discharge Barriers Identified No family/friends to help     Why the patient remains in the hospital Social issues        Post-Acute Status    Coverage [employer] Managed Medicare     Discharge Delays Other   Parents are  and search for relatives is ongoing.                  This patient has been screened for Case Management needs.  Based on (documentation in medical record/communication with attending physician), patient is medically stable for discharge.      Discharge planning:  Search for relatives willing to care for patient is ongoing as patient's parents are .  Father discovered  in home 2022 and patient admitted to hospital with a diagnosis of:  Diagnoses of Tachycardia, Dehydration, and Chest pain were pertinent to this visit.    Legal has been contacted for assistance.

## 2022-09-27 NOTE — PLAN OF CARE
Continue Regular Diet as tolerated  RD addition of Boost (+) TID  LBM 9/21 - Addition of Bowel regimen   No other new recommendations at this time.      Recommendations    1) SLP to evaluate for chewing/Swallowing abilities and texture appropriateness  2) Continue Regular Diet - texture per SLP, encourage PO, Fluid consumption  3) Addition of Boost (+) TID to assist in meeting pt increased energy needs  4) Monitor Nutrition related labs    Goals:   1) Pt to meet adequate PO/Fluid intake by RD follow up  2) Monitored labs to trend toward target ranges    Nutrition Goal Status: progressing towards goal  Communication of RD Recs:  (POC)

## 2022-09-28 PROCEDURE — 94761 N-INVAS EAR/PLS OXIMETRY MLT: CPT

## 2022-09-28 PROCEDURE — 25000003 PHARM REV CODE 250: Performed by: INTERNAL MEDICINE

## 2022-09-28 PROCEDURE — 25000003 PHARM REV CODE 250: Performed by: HOSPITALIST

## 2022-09-28 PROCEDURE — 11000001 HC ACUTE MED/SURG PRIVATE ROOM

## 2022-09-28 RX ADMIN — TERBINAFINE TABLETS 250 MG 250 MG: 250 TABLET ORAL at 09:09

## 2022-09-28 RX ADMIN — AMLODIPINE BESYLATE 10 MG: 5 TABLET ORAL at 09:09

## 2022-09-28 RX ADMIN — POLYETHYLENE GLYCOL 3350 17 G: 17 POWDER, FOR SOLUTION ORAL at 09:09

## 2022-09-28 RX ADMIN — THERA TABS 1 TABLET: TAB at 09:09

## 2022-09-28 NOTE — PHYSICIAN QUERY
PT Name: Miguelito Landin  MR #: [id number]    DOCUMENTATION CLARIFICATION     CDS/: Rachel Whyte RN           Contact information:  [email or web address]     This form is a permanent document in the medical record.     Query Date: September 28, 2022    By submitting this query, we are merely seeking further clarification of documentation.. Please utilize your independent clinical judgment when addressing the question(s) below.    The medical record contains the following:   Indicators  Supporting Clinical Findings Location in Medical Record   x Energy Intake Energy Intake: severe energy intake  Energy Intake (Malnutrition): less than 75% for greater than 7 days   Nutritional Consult of 9/23   x Weight Loss Weight Loss (Malnutrition):  (WILLARD - No weight hx, pt poor historian)   Nutritional Consult of 9/23    x Fat Loss Muscle wasting & contractures, SQ fat loss  Neck/Chest (Micronutrient): muscle wasting, subcutaneous fat loss  Musculoskeletal/Lower Extremities: muscle wasting, subcutaneous fat loss   Subcutaneous Fat (Malnutrition): moderate depletion   Nutritional Consult of 9/23    x Muscle Loss Muscle wasting & contractures, SQ fat loss  Neck/Chest (Micronutrient): muscle wasting, subcutaneous fat loss  Musculoskeletal/Lower Extremities: muscle wasting, subcutaneous fat loss   Muscle Mass (Malnutrition): moderate depletion   Merna Region (Muscle Loss): moderate depletion  Clavicle and Acromion Bone Region (Muscle Loss): moderate depletion  Dorsal Hand (Muscle Loss): severe depletion    Nutritional Consult of 9/23     Edema/Fluid Accumulation       x Reduced  Strength (by dynamometer)   Neurological:  Positive for weakness.  PN of 9/22    x Weight, BMI, Usual Body Weight Weight Method: Bed Scale  Weight: 64.5 kg (142 lb 3.2 oz)  Weight (lb): 142.2 lb  Ideal Body Weight (IBW), Male: 178 lb  % Ideal Body Weight, Male (lb): 79.89 %  BMI (Calculated): 19.3  BMI Grade: 18.5-24.9 - normal    Nutritional Consult of      Delayed Wound Healing        Registered Dietician Diagnosis       x Acute or Chronic Illness Acute Illness: Dehydration with hypernatremia, SIRS, Hypercalcemia, Cerebral palsy,     past medical history of cerebral palsy with spasticity and anxiety   H & P of        H & P of     x Social or Environmental Circumstances On arrival, it was found that the patient's father had , and  the patient was confined to his bed due to his mobility issues (bed-bound).  The last known contact with the patient and his father was 22.  In the interim, the patient has had no water or food, and had to languish in his own urine and stool until  help arrived today.    Pt presented to ED via EMS after wellness check at pt home. Pt father and pt had not been seen for several days - upon check of home pt father and primary caregiver was found to be . Pt is bed bound 2' cerebral palsy and during this time was without access to food and water. Upon arrival pt was dehydrated and hypernatremic - provided with IVF and derangements improved. % of meals thus far.        H & P of                Nutritional Consult of    x Treatment  1)) SLP to evaluate for chewing/Swallowing abilities and texture appropriateness  2) Continue Regular Diet - texture per SLP, encourage PO, Fluid consumption  3) Addition of Boost (+) TID to assist in meeting pt increased energy needs  4) Monitor Nutrition related labs   Nutritional Consult of     x Other Malnutrition Type: acute illness or injury, chronic illness          Nutritional Consult of          Academy of Nutrition and Dietetics (Academy) and the American Society for Parenteral and Enteral Nutrition (A.S.P.E.N.) Clinical Characteristics to support Malnutrition   Malnutrition in the Context of Acute Illness or Injury Malnutrition in the Context of Chronic Illness or Injury Malnutrition in the Context of Social or Environmental  Circumstances   Malnutrition Level Moderate Severe Moderate Severe   Moderate   Severe   Energy Intake <75%                   >7 days <50%                 >5 days <75%           >1 month <75%                      >1 month   <75% for >3 months   <50% for >1 month   Weight Loss   1-2% in 1 week >2% in 1 week 5% in 1 month >5% in 1 month 5% in 1 month >5% in 1 month    5% in 1 month >5% in 1 month 7.5% in 3 months >7.5% in 3 months 7.5% in 3 months >7.5% in 3 months    7.5% in 3 months >7.5% in 3 months 10% in 6 months >10% in 6 months 10% in 6 months >10% in 6 months        20% in 1 year                    >20% in 1 year                                                                  20% in 1 year                            >20% in 1 year                                                  Subcutaneous Fat Loss Mild  Moderate  Mild  Severe    Mild   Severe   Muscle Loss Mild depletion Moderate depletion  Mild depletion Severe Depletion   Mild   Severe   Edema/Fluid Accumulation Mild Moderate to severe  Mild  Severe   Mild   Severe   Reduced  Strength         (based on standards supplied by  of dynamometer) N/A Measurably reduced N/A Measurably reduced N/A Measurably reduced     Criteria for mild malnutrition is defined as 1 characteristic outlined above within the established moderate or severe parameters.  A minimum of 2 out of the 6 characteristics noted above are recommended for a diagnosis of moderate or severe malnutrition.  Chronic illness/injury is a disease/condition lasting 3 months or longer.    The noted clinical guidelines are only system guidelines and do not replace the providers clinical judgment.    Provider, please specify diagnosis or diagnoses associated with above clinical findings.    [  ] Mild Malnutrition - 1 characteristic outlined above within the established moderate or severe parameters     [ x ] Moderate Malnutrition - a minimum of 2 of the 6 moderate malnutrition  characteristics noted above      [  ] Severe Malnutrition - a minimum of 2 of the 6 severe malnutrition characteristics noted above      [  ] Other Nutritional Diagnosis (please specify): _______     [  ] Clinically Undetermined       Please document in your progress notes daily for the duration of treatment until resolved and  include in your discharge summary.      References:    KEVIN Grey, & YONATAN Victoria (2022, April). Assessment and management of anorexia and cachexia in palliative care. Retrieved May 23, 2022, from https://www.[employer]/contents/assessment-and-management-[email or web address]-anorexia-and-cachexia-in-palliative-care?[email or web address]=see_link     ONDINA Maria, PhD, RD, Ricarda LOPEZ P., PhD, RN, RACHEL Elizondo MD, PhD, Luisa CONROY A., MS, RD, [facility], IHSAN Arias, MS, RD, The Academy Malnutrition Work Group, The A.S.P.E.N. Board of Directors. (2012). Consensus Statement: Academy of Nutrition and Dietetics and American Society for Parenteral and Enteral Nutrition: Characteristics Recommended for the Identification and Documentation of Adult Malnutrition (Undernutrition). Journal of Parenteral and Enteral Nutrition, 36(3), 275-283. doi:10.1177/8479475519310277     Form No. 50461

## 2022-09-28 NOTE — PROGRESS NOTES
"[facility] Medicine  Progress Note    Patient Name: Miguelito Landin  MRN: [id number]  Patient Class: IP- Inpatient   Admission Date: 2022  Length of Stay: 7 days  Attending Physician: Blade Santana MD  Primary Care Provider: Primary Doctor No        Subjective:     Principal Problem:Dehydration with hypernatremia        HPI:  Mr. Landin is a 42yo man with a past medical history of cerebral palsy with spasticity and anxiety.  At baseline, he is normally cared for by his father at home.  He is a poor historian and cannot state to me what happened today.    Dr. Spring, the ED staff, was able to gather some collateral from the EMS on arrival.  Apparently his father and he had not been seen for quite some time, so a care check was initiated.  On arrival, it was found that the patient's father had , and the patient was confined to his bed due to his mobility issues (bed-bound).  The last known contact with the patient and his father was 22.  In the interim, the patient has had no water or food, and had to languish in his own urine and stool until help arrived today.  The patient would not speak to me about his father's death despite my bringing it up directly.  All he will say repeatedly is, "something happened."  EMS did notify the patient on arrival that his father was , at which point he did become extremely upset.    In the ED his VS's were BP (!) 152/94   Pulse (!) 158 -> 127 -> 114   Temp 98 °F (36.7 °C)   Resp 19   Ht 6' (1.829 m)   Wt 68 kg (150 lb)   SpO2 (!) 94% RA  BMI 20.34 kg/m².  Labs showed WBC 18, Hg 19, HCT 54, .  , HCO3 14, BUN 17, Cr 1.2, AG 24.  Gluc 115, Ca 10.7, TB 2.4, normal LFT's.  CPK 43, LA 1.7, BHB 4.6.  COVID NEG. VBG pH 7.39, PCO2 30.     No radiographs were done in the ED.  (CXR now ordered and pending though).  EKG showed sinus tachycardia to 148 with MARYAM and non-specific ST changes.     In the ED he was treated with " a banana bag bolus , and NS 2L bolus .      Overview/Hospital Course:  Mr Miguelito Landin who is bedbound from cerebral palsy who was admitted with hypernatremia, hypercalcemia due to dehydration from lack of access to food/water after his caretaker/father . Electrolyte disturbances improved. Tolerating diet. He is reasonably having stress and anxiety related to this; PRN xanax available. Social work consulted for locating his next of kin (both parents , other family is in [facility]) and plans for his care going forward.  Patient became placement issue       Interval History: No new issues     Review of Systems   Constitutional:  Positive for activity change. Negative for appetite change, chills, diaphoresis and fatigue.   HENT:  Negative for congestion and dental problem.    Eyes:  Negative for discharge and itching.   Respiratory:  Negative for apnea and chest tightness.    Cardiovascular:  Negative for chest pain and leg swelling.   Gastrointestinal:  Negative for abdominal distention and abdominal pain.   Endocrine: Negative for cold intolerance.   Genitourinary:  Negative for difficulty urinating and dysuria.   Musculoskeletal:  Negative for arthralgias and back pain.   Neurological:  Negative for dizziness.   Psychiatric/Behavioral:  Negative for agitation and behavioral problems.    Objective:     Vital Signs (Most Recent):  Temp: 99.3 °F (37.4 °C) (22 0753)  Pulse: 102 (22)  Resp: 20 (22)  BP: (!) 138/94 (22)  SpO2: 95 % (22)   Vital Signs (24h Range):  Temp:  [98 °F (36.7 °C)-99.3 °F (37.4 °C)] 99.3 °F (37.4 °C)  Pulse:  [] 102  Resp:  [18-20] 20  SpO2:  [95 %-96 %] 95 %  BP: (119-140)/(76-94) 138/94     Weight: 64.5 kg (142 lb 3.2 oz)  Body mass index is 19.29 kg/m².    Intake/Output Summary (Last 24 hours) at 2022 0961  Last data filed at 2022 0200  Gross per 24 hour   Intake 840 ml   Output 1500 ml   Net -660 ml       Physical Exam  Vitals and nursing note reviewed.   Constitutional:       General: He is not in acute distress.     Appearance: He is ill-appearing. He is not toxic-appearing.   HENT:      Head: Normocephalic and atraumatic.   Eyes:      Conjunctiva/sclera: Conjunctivae normal.   Cardiovascular:      Rate and Rhythm: Normal rate and regular rhythm.   Pulmonary:      Effort: Pulmonary effort is normal. No respiratory distress.   Abdominal:      General: There is no distension.   Skin:     General: Skin is warm and dry.   Neurological:      Mental Status: He is alert and oriented to person, place, and time.   Psychiatric:         Mood and Affect: Mood normal.         Behavior: Behavior normal.       Significant Labs: All pertinent labs within the past 24 hours have been reviewed.  CBC: No results for input(s): WBC, HGB, HCT, PLT in the last 48 hours.  CMP: No results for input(s): NA, K, CL, CO2, GLU, BUN, CREATININE, CALCIUM, PROT, ALBUMIN, BILITOT, ALKPHOS, AST, ALT, ANIONGAP, EGFRNONAA in the last 48 hours.    Invalid input(s): ESTGFAFRICA    Significant Imaging:       Assessment/Plan:      * Dehydration with hypernatremia  Na 151 on admit due to dehydration from lack of PO access.  Resolved with IVF. Tolerating regular diet  DC IVF       Resolved           Onychomycosis  Will see if wound care can trim his toenails which are thickened and digging into his foot skin. Podiatry cannot.   Started terbinafine x2 weeks for treatment       Tinea pedis of both feet  Started terbinafine x2 weeks  Check CMP      Hypophosphatemia  Replace and monitor  Monitor for refeeding      Cerebral palsy  Consult social work for placement  Unsure if he has any family to care for him now that his father has - case management is working on this      Hypercalcemia  Due severe dehydration. Resolved with IVF        SIRS (systemic inflammatory response syndrome)  This was most likely from dehydration and has now resolved. No signs of  infection          VTE Risk Mitigation (From admission, onward)         Ordered     IP VTE LOW RISK PATIENT  Once         22     Place sequential compression device  Until discontinued         22     Place DAPHNE hose  Until discontinued         22                Discharge Planning   RADHA:      Code Status: Full Code   Is the patient medically ready for discharge?:     Reason for patient still in hospital (select all that apply):   Discharge Plan A: Other (TBD)   Discharge Delays: (!) Other (Parents are  and search for relatives is ongoing.)              Blade Jones MD  Department of Hospital Medicine   [facility] [place] [facility]

## 2022-09-28 NOTE — SUBJECTIVE & OBJECTIVE
Interval History: No new issues     Review of Systems   Constitutional:  Positive for activity change. Negative for appetite change, chills, diaphoresis and fatigue.   HENT:  Negative for congestion and dental problem.    Eyes:  Negative for discharge and itching.   Respiratory:  Negative for apnea and chest tightness.    Cardiovascular:  Negative for chest pain and leg swelling.   Gastrointestinal:  Negative for abdominal distention and abdominal pain.   Endocrine: Negative for cold intolerance.   Genitourinary:  Negative for difficulty urinating and dysuria.   Musculoskeletal:  Negative for arthralgias and back pain.   Neurological:  Negative for dizziness.   Psychiatric/Behavioral:  Negative for agitation and behavioral problems.    Objective:     Vital Signs (Most Recent):  Temp: 99.3 °F (37.4 °C) (09/28/22 0753)  Pulse: 102 (09/28/22 0753)  Resp: 20 (09/28/22 0753)  BP: (!) 138/94 (09/28/22 0753)  SpO2: 95 % (09/28/22 0753)   Vital Signs (24h Range):  Temp:  [98 °F (36.7 °C)-99.3 °F (37.4 °C)] 99.3 °F (37.4 °C)  Pulse:  [] 102  Resp:  [18-20] 20  SpO2:  [95 %-96 %] 95 %  BP: (119-140)/(76-94) 138/94     Weight: 64.5 kg (142 lb 3.2 oz)  Body mass index is 19.29 kg/m².    Intake/Output Summary (Last 24 hours) at 9/28/2022 0928  Last data filed at 9/28/2022 0200  Gross per 24 hour   Intake 840 ml   Output 1500 ml   Net -660 ml      Physical Exam  Vitals and nursing note reviewed.   Constitutional:       General: He is not in acute distress.     Appearance: He is ill-appearing. He is not toxic-appearing.   HENT:      Head: Normocephalic and atraumatic.   Eyes:      Conjunctiva/sclera: Conjunctivae normal.   Cardiovascular:      Rate and Rhythm: Normal rate and regular rhythm.   Pulmonary:      Effort: Pulmonary effort is normal. No respiratory distress.   Abdominal:      General: There is no distension.   Skin:     General: Skin is warm and dry.   Neurological:      Mental Status: He is alert and oriented to  person, place, and time.   Psychiatric:         Mood and Affect: Mood normal.         Behavior: Behavior normal.       Significant Labs: All pertinent labs within the past 24 hours have been reviewed.  CBC: No results for input(s): WBC, HGB, HCT, PLT in the last 48 hours.  CMP: No results for input(s): NA, K, CL, CO2, GLU, BUN, CREATININE, CALCIUM, PROT, ALBUMIN, BILITOT, ALKPHOS, AST, ALT, ANIONGAP, EGFRNONAA in the last 48 hours.    Invalid input(s): ESTGFAFRICA    Significant Imaging:

## 2022-09-29 PROCEDURE — 25000003 PHARM REV CODE 250: Performed by: INTERNAL MEDICINE

## 2022-09-29 PROCEDURE — 11000001 HC ACUTE MED/SURG PRIVATE ROOM

## 2022-09-29 PROCEDURE — 25000003 PHARM REV CODE 250: Performed by: HOSPITALIST

## 2022-09-29 RX ADMIN — TERBINAFINE TABLETS 250 MG 250 MG: 250 TABLET ORAL at 08:09

## 2022-09-29 RX ADMIN — THERA TABS 1 TABLET: TAB at 08:09

## 2022-09-29 RX ADMIN — POLYETHYLENE GLYCOL 3350 17 G: 17 POWDER, FOR SOLUTION ORAL at 08:09

## 2022-09-29 RX ADMIN — AMLODIPINE BESYLATE 10 MG: 5 TABLET ORAL at 08:09

## 2022-09-29 NOTE — PLAN OF CARE
Plan of care reviewed with patient.  Patient confused at baseline.  Patient being turned every two hours with wedge, and helped with feedings.  Patient now resting comfortably in bed locked in lowest position, side rails up x3, and call bell in reach.  Will continue to monitor.       Problem: Adult Inpatient Plan of Care  Goal: Plan of Care Review  Outcome: Ongoing, Progressing     Problem: Adult Inpatient Plan of Care  Goal: Patient-Specific Goal (Individualized)  Outcome: Ongoing, Progressing

## 2022-09-29 NOTE — PROGRESS NOTES
"[facility] Medicine  Progress Note    Patient Name: Miguelito Landin  MRN: [id number]  Patient Class: IP- Inpatient   Admission Date: 2022  Length of Stay: 8 days  Attending Physician: Blade Santana MD  Primary Care Provider: Primary Doctor No        Subjective:     Principal Problem:Dehydration with hypernatremia        HPI:  Mr. Landin is a 40yo man with a past medical history of cerebral palsy with spasticity and anxiety.  At baseline, he is normally cared for by his father at home.  He is a poor historian and cannot state to me what happened today.    Dr. Spring, the ED staff, was able to gather some collateral from the EMS on arrival.  Apparently his father and he had not been seen for quite some time, so a care check was initiated.  On arrival, it was found that the patient's father had , and the patient was confined to his bed due to his mobility issues (bed-bound).  The last known contact with the patient and his father was 22.  In the interim, the patient has had no water or food, and had to languish in his own urine and stool until help arrived today.  The patient would not speak to me about his father's death despite my bringing it up directly.  All he will say repeatedly is, "something happened."  EMS did notify the patient on arrival that his father was , at which point he did become extremely upset.    In the ED his VS's were BP (!) 152/94   Pulse (!) 158 -> 127 -> 114   Temp 98 °F (36.7 °C)   Resp 19   Ht 6' (1.829 m)   Wt 68 kg (150 lb)   SpO2 (!) 94% RA  BMI 20.34 kg/m².  Labs showed WBC 18, Hg 19, HCT 54, .  , HCO3 14, BUN 17, Cr 1.2, AG 24.  Gluc 115, Ca 10.7, TB 2.4, normal LFT's.  CPK 43, LA 1.7, BHB 4.6.  COVID NEG. VBG pH 7.39, PCO2 30.     No radiographs were done in the ED.  (CXR now ordered and pending though).  EKG showed sinus tachycardia to 148 with MARYAM and non-specific ST changes.     In the ED he was treated with " a banana bag bolus , and NS 2L bolus .      Overview/Hospital Course:  Mr Miguelito Landin who is bedbound from cerebral palsy who was admitted with hypernatremia, hypercalcemia due to dehydration from lack of access to food/water after his caretaker/father . Electrolyte disturbances improved. Tolerating diet. He is reasonably having stress and anxiety related to this; PRN xanax available. Social work consulted for locating his next of kin (both parents , other family is in [facility]) and plans for his care going forward.  Patient became placement issue       Interval History: No new issues     Review of Systems   Constitutional:  Positive for activity change. Negative for appetite change, chills, diaphoresis and fatigue.   HENT:  Negative for congestion and dental problem.    Eyes:  Negative for discharge and itching.   Respiratory:  Negative for apnea and chest tightness.    Cardiovascular:  Negative for chest pain and leg swelling.   Gastrointestinal:  Negative for abdominal distention and abdominal pain.   Endocrine: Negative for cold intolerance.   Genitourinary:  Negative for difficulty urinating and dysuria.   Musculoskeletal:  Negative for arthralgias and back pain.   Neurological:  Negative for dizziness.   Psychiatric/Behavioral:  Negative for agitation and behavioral problems.    Objective:     Vital Signs (Most Recent):  Temp: 97.6 °F (36.4 °C) (22)  Pulse: 109 (22)  Resp: 18 (22)  BP: 132/72 (22)  SpO2: 96 % (22) Vital Signs (24h Range):  Temp:  [97.4 °F (36.3 °C)-99.5 °F (37.5 °C)] 97.6 °F (36.4 °C)  Pulse:  [] 109  Resp:  [16-20] 18  SpO2:  [93 %-97 %] 96 %  BP: (109-138)/(72-94) 132/72     Weight: 64.5 kg (142 lb 3.2 oz)  Body mass index is 19.29 kg/m².    Intake/Output Summary (Last 24 hours) at 2022 0618  Last data filed at 2022 0500  Gross per 24 hour   Intake 1200 ml   Output 1500 ml   Net -300 ml       Physical Exam  Vitals and nursing note reviewed.   Constitutional:       General: He is not in acute distress.     Appearance: He is ill-appearing. He is not toxic-appearing.   HENT:      Head: Normocephalic and atraumatic.   Eyes:      Conjunctiva/sclera: Conjunctivae normal.   Cardiovascular:      Rate and Rhythm: Normal rate and regular rhythm.   Pulmonary:      Effort: Pulmonary effort is normal. No respiratory distress.   Abdominal:      General: There is no distension.   Skin:     General: Skin is warm and dry.   Neurological:      Mental Status: He is alert and oriented to person, place, and time.   Psychiatric:         Mood and Affect: Mood normal.         Behavior: Behavior normal.       Significant Labs: All pertinent labs within the past 24 hours have been reviewed.  CBC: No results for input(s): WBC, HGB, HCT, PLT in the last 48 hours.  CMP: No results for input(s): NA, K, CL, CO2, GLU, BUN, CREATININE, CALCIUM, PROT, ALBUMIN, BILITOT, ALKPHOS, AST, ALT, ANIONGAP, EGFRNONAA in the last 48 hours.    Invalid input(s): ESTGFAFRICA    Significant Imaging:       Assessment/Plan:      * Dehydration with hypernatremia  Na 151 on admit due to dehydration from lack of PO access.  Resolved with IVF. Tolerating regular diet  DC IVF       Resolved           Onychomycosis  Will see if wound care can trim his toenails which are thickened and digging into his foot skin. Podiatry cannot.   Started terbinafine x2 weeks for treatment       Tinea pedis of both feet  Started terbinafine x2 weeks  Check CMP      Hypophosphatemia  Replace and monitor  Monitor for refeeding      Cerebral palsy  Consult social work for placement  Unsure if he has any family to care for him now that his father has - case management is working on this      Hypercalcemia  Due severe dehydration. Resolved with IVF        SIRS (systemic inflammatory response syndrome)  This was most likely from dehydration and has now resolved. No signs of  infection          VTE Risk Mitigation (From admission, onward)         Ordered     IP VTE LOW RISK PATIENT  Once         22     Place sequential compression device  Until discontinued         22     Place DAPHNE hose  Until discontinued         22                Discharge Planning   RADHA:      Code Status: Full Code   Is the patient medically ready for discharge?:     Reason for patient still in hospital (select all that apply):  Discharge Plan A: Other (TBD)   Discharge Delays: (!) Other (Parents are  and search for relatives is ongoing.)    Placement issue          Blade Jones MD  Department of Hospital Medicine   [facility] Surg

## 2022-09-29 NOTE — PROGRESS NOTES
Letter of Consideration received from [facility].  Awaiting review from the Office of Behavioral Health and/or the Office for Citizens with Developmental Disabilities (OCDD).    Patient cannot be admitted into a nursing facility until this process has been  completed.

## 2022-09-29 NOTE — SUBJECTIVE & OBJECTIVE
Interval History: No new issues     Review of Systems   Constitutional:  Positive for activity change. Negative for appetite change, chills, diaphoresis and fatigue.   HENT:  Negative for congestion and dental problem.    Eyes:  Negative for discharge and itching.   Respiratory:  Negative for apnea and chest tightness.    Cardiovascular:  Negative for chest pain and leg swelling.   Gastrointestinal:  Negative for abdominal distention and abdominal pain.   Endocrine: Negative for cold intolerance.   Genitourinary:  Negative for difficulty urinating and dysuria.   Musculoskeletal:  Negative for arthralgias and back pain.   Neurological:  Negative for dizziness.   Psychiatric/Behavioral:  Negative for agitation and behavioral problems.    Objective:     Vital Signs (Most Recent):  Temp: 97.6 °F (36.4 °C) (09/29/22 0407)  Pulse: 109 (09/29/22 0407)  Resp: 18 (09/29/22 0407)  BP: 132/72 (09/29/22 0407)  SpO2: 96 % (09/29/22 0407) Vital Signs (24h Range):  Temp:  [97.4 °F (36.3 °C)-99.5 °F (37.5 °C)] 97.6 °F (36.4 °C)  Pulse:  [] 109  Resp:  [16-20] 18  SpO2:  [93 %-97 %] 96 %  BP: (109-138)/(72-94) 132/72     Weight: 64.5 kg (142 lb 3.2 oz)  Body mass index is 19.29 kg/m².    Intake/Output Summary (Last 24 hours) at 9/29/2022 0618  Last data filed at 9/29/2022 0500  Gross per 24 hour   Intake 1200 ml   Output 1500 ml   Net -300 ml      Physical Exam  Vitals and nursing note reviewed.   Constitutional:       General: He is not in acute distress.     Appearance: He is ill-appearing. He is not toxic-appearing.   HENT:      Head: Normocephalic and atraumatic.   Eyes:      Conjunctiva/sclera: Conjunctivae normal.   Cardiovascular:      Rate and Rhythm: Normal rate and regular rhythm.   Pulmonary:      Effort: Pulmonary effort is normal. No respiratory distress.   Abdominal:      General: There is no distension.   Skin:     General: Skin is warm and dry.   Neurological:      Mental Status: He is alert and oriented to  person, place, and time.   Psychiatric:         Mood and Affect: Mood normal.         Behavior: Behavior normal.       Significant Labs: All pertinent labs within the past 24 hours have been reviewed.  CBC: No results for input(s): WBC, HGB, HCT, PLT in the last 48 hours.  CMP: No results for input(s): NA, K, CL, CO2, GLU, BUN, CREATININE, CALCIUM, PROT, ALBUMIN, BILITOT, ALKPHOS, AST, ALT, ANIONGAP, EGFRNONAA in the last 48 hours.    Invalid input(s): [place]    Significant Imaging:

## 2022-09-30 PROCEDURE — 11000001 HC ACUTE MED/SURG PRIVATE ROOM

## 2022-09-30 PROCEDURE — 30200315 PPD INTRADERMAL TEST REV CODE 302: Performed by: INTERNAL MEDICINE

## 2022-09-30 PROCEDURE — 86580 TB INTRADERMAL TEST: CPT | Performed by: INTERNAL MEDICINE

## 2022-09-30 PROCEDURE — 97161 PT EVAL LOW COMPLEX 20 MIN: CPT

## 2022-09-30 PROCEDURE — 97166 OT EVAL MOD COMPLEX 45 MIN: CPT

## 2022-09-30 PROCEDURE — 25000003 PHARM REV CODE 250: Performed by: INTERNAL MEDICINE

## 2022-09-30 PROCEDURE — 25000003 PHARM REV CODE 250: Performed by: HOSPITALIST

## 2022-09-30 RX ADMIN — POLYETHYLENE GLYCOL 3350 17 G: 17 POWDER, FOR SOLUTION ORAL at 09:09

## 2022-09-30 RX ADMIN — THERA TABS 1 TABLET: TAB at 09:09

## 2022-09-30 RX ADMIN — AMLODIPINE BESYLATE 10 MG: 5 TABLET ORAL at 09:09

## 2022-09-30 RX ADMIN — TUBERCULIN PURIFIED PROTEIN DERIVATIVE 5 UNITS: 5 INJECTION, SOLUTION INTRADERMAL at 04:09

## 2022-09-30 RX ADMIN — TERBINAFINE TABLETS 250 MG 250 MG: 250 TABLET ORAL at 09:09

## 2022-09-30 NOTE — PROGRESS NOTES
Call received from Lolis Morelos with the Office of Behavior[facility] ([phone]).  Ms. Jethro stated that Mr. Glenn Smith plans to visit patient on Monday, October 3, 2022 at 9:00 a.m.  Ms. Morelos requested copies of patient's records.  Records are to be faxed to [phone].      JEREMIAH fu with Ms. Pedro to verify agency.  Ms. Pedro is with [facility] ([facility]).  Addendum with correction placed on previous note.  Ms. Pedro requested to change her visit from today to Monday, 10/3/2022 at 3:30 p.m.

## 2022-09-30 NOTE — PLAN OF CARE
[facility] - Med Surg  Discharge Reassessment    Primary Care Provider: [facility]    Expected Discharge Date: pending    Reassessment (most recent)       Discharge Reassessment - 09/30/22 1616          Discharge Reassessment    Assessment Type Discharge Planning Reassessment     Did the patient's condition or plan change since previous assessment? No     Discharge Plan discussed with: --   Attempt to find relatives willing to accept patient is ongoing.    Communicated RADHA with patient/caregiver Other (see comments)   Awaiting assessments by [facility] and [facility].    Discharge Plan A Group Home   [facility] to assess for possible group home placement; [facility] ([facility]) also to assess.    Discharge Plan B New Nursing Home placement - [facility] care facility   May need nursing home placement while group home placement is being explored.  [facility] will recommend least restrictive most appropriate setting.    DME Needed Upon Discharge  wheelchair     Discharge Barriers Identified No family/friends to help     Why the patient remains in the hospital Social issues        Post-Acute Status    Coverage [employer] Medicare     Discharge Delays Other   Attempting to locate next of kin.                  This patient has been screened for Case Management needs.  Based on (documentation in medical record/communication with attending physician/communication with nursing), patient is medically stable for discharge;  Discharge barriers:  Need to locate next of kin; Assessments by the [facility] ([facility]) and the [facility] ([facility]) are pending  for possible placement - group home vs. NH; 142 pending.    Awaiting placement recommendation from [facility].  In the interim, referrals have been submitted to several nursing facilities.    [facility] (Herlinda Pedro) and [facility] (Glenn Simth) plan to visit on Monday, October 1, 2022.  [facility] to visit at 3:30 p.m. and [facility] at 9:00 a.m.    Case  Management/Social Work remains available if a need arises, please enter consult for assistance.  For urgent needs contact Case Management Department/on-call at:  [phone].

## 2022-09-30 NOTE — SUBJECTIVE & OBJECTIVE
Interval History:  no new issues    Review of Systems   Constitutional:  Positive for activity change. Negative for appetite change, chills, diaphoresis and fatigue.   HENT:  Negative for congestion and dental problem.    Eyes:  Negative for discharge and itching.   Respiratory:  Negative for apnea and chest tightness.    Cardiovascular:  Negative for chest pain and leg swelling.   Gastrointestinal:  Negative for abdominal distention and abdominal pain.   Endocrine: Negative for cold intolerance.   Genitourinary:  Negative for difficulty urinating and dysuria.   Musculoskeletal:  Negative for arthralgias and back pain.   Neurological:  Negative for dizziness.   Psychiatric/Behavioral:  Negative for agitation and behavioral problems.    Objective:     Vital Signs (Most Recent):  Temp: 97.9 °F (36.6 °C) (09/30/22 0730)  Pulse: 88 (09/30/22 0730)  Resp: 15 (09/30/22 0730)  BP: 127/71 (09/30/22 0730)  SpO2: 95 % (09/30/22 0730) Vital Signs (24h Range):  Temp:  [97.8 °F (36.6 °C)-98.6 °F (37 °C)] 97.9 °F (36.6 °C)  Pulse:  [] 88  Resp:  [14-15] 15  SpO2:  [93 %-97 %] 95 %  BP: (119-138)/(67-97) 127/71     Weight: 64.5 kg (142 lb 3.2 oz)  Body mass index is 19.29 kg/m².    Intake/Output Summary (Last 24 hours) at 9/30/2022 0920  Last data filed at 9/30/2022 0600  Gross per 24 hour   Intake 300 ml   Output 900 ml   Net -600 ml      Physical Exam  Vitals and nursing note reviewed.   Constitutional:       General: He is not in acute distress.     Appearance: He is ill-appearing. He is not toxic-appearing.   HENT:      Head: Normocephalic and atraumatic.   Eyes:      Conjunctiva/sclera: Conjunctivae normal.   Cardiovascular:      Rate and Rhythm: Normal rate and regular rhythm.   Pulmonary:      Effort: Pulmonary effort is normal. No respiratory distress.   Abdominal:      General: There is no distension.   Skin:     General: Skin is warm and dry.   Neurological:      Mental Status: He is alert and oriented to person,  place, and time.   Psychiatric:         Mood and Affect: Mood normal.         Behavior: Behavior normal.       Significant Labs: All pertinent labs within the past 24 hours have been reviewed.  CBC: No results for input(s): WBC, HGB, HCT, PLT in the last 48 hours.  CMP: No results for input(s): NA, K, CL, CO2, GLU, BUN, CREATININE, CALCIUM, PROT, ALBUMIN, BILITOT, ALKPHOS, AST, ALT, ANIONGAP, EGFRNONAA in the last 48 hours.    Invalid input(s): [place]    Significant Imaging:

## 2022-09-30 NOTE — PLAN OF CARE
Problem: Physical Therapy  Goal: Physical Therapy Goal  Outcome: Adequate for Care Transition   Initial PT evaluation performed today.   Pt unwilling to participate in full evaluation.  Appears to have been dependent for all functional mobility PTA.  PT to sign off.  Positioning and rolling every 2 hrs recommended to prevent further deformity and skin breakdown.  Pt requires 24hr care.

## 2022-09-30 NOTE — PT/OT/SLP EVAL
Physical Therapy Evaluation and Discharge Note    Patient Name:  Miguelito Landin   MRN:  [id number]    Recommendations:     Discharge Recommendations:  nursing facility, basic   Discharge Equipment Recommendations: hospital bed   Barriers to discharge:  Pt requires 24hr care  Pt's sole CG was his father who has since   Assessment:     Miguelito Landin is a 41 y.o. male admitted with a medical diagnosis of Dehydration with hypernatremia. .  At this time, patient is functioning at their prior level of function and does not require further acute PT services.     Recent Surgery: * No surgery found *      Plan:     During this hospitalization, patient does not require further acute PT services.  Please re-consult if situation changes.      Subjective     Chief Complaint: Pt does not want his LE's touched  Patient/Family Comments/goals: Pt declined functional portion of PT evaluation and LE evaluation  Pain/Comfort:  Pain Rating 1:  (Pt yells no as if in pain when attempting to remove Pressure relief boots or touching LE's)    Patients cultural, spiritual, Baptism conflicts given the current situation: no    Living Environment:  Pt was living with his father PTA.  FAther was sole CG for pt.  FAther has since   Prior to admission, patients level of function was Pt claims that he was able to assist with W/C transfer prior to admit, but unsure if this is reliable information as his LE's appear to be severely contracted .  Equipment used at home:  (Pt states that he has a W/C and a regular bed).  DME owned (not currently used): none.  Upon discharge, patient will have assistance from No one.    Objective:     Communicated with nsg prior to session.  Patient found  with wedge on L side and pillow between knees  with bed alarm, pressure relief boots upon PT entry to room.    General Precautions: Standard, aspiration (skin/joint integrity)   Orthopedic Precautions:N/A   Braces: N/A   Respiratory Status:  Room air    Exams:  Cognitive Exam:  Patient is oriented to Person, Place, and Time  Gross Motor Coordination:  Maximal impairment  Postural Exam:  Patient presented with the following abnormalities:    -       Rounded shoulders  -       Forward head  -       severe B hip/knee flexion  Sensation:    -       Intact  light/touch B LE's  Skin Integrity/Edema:      -       Skin integrity: Visible skin intact  -       Edema: None noted    RLE ROM: unable to formally test, appears severely contracted at hips/knees, R ankle severely inverted  RLE Strength: No volitional movement noted  LLE ROM: Unable to formally assess, appears severely contracted at hips/knees  LLE Strength: no volitional movement noted  B LE spasticity    Functional Mobility:  Pt declined    AM-PAC 6 CLICK MOBILITY  Total Score:7       Therapeutic Activities and Exercises:   N/A, limited evaluation only    AM-PAC 6 CLICK MOBILITY  Total Score:7     Patient left  in same position as found   with call button in reach, bed alarm on, and nsg notified.    GOALS:   Multidisciplinary Problems       Physical Therapy Goals          Problem: Physical Therapy    Goal Priority Disciplines Outcome Goal Variances Interventions   Physical Therapy Goal     PT, PT/OT Adequate for Care Transition                         History:     Past Medical History:   Diagnosis Date    Anxiety     CP (cerebral palsy)     Spasticity        History reviewed. No pertinent surgical history.    Time Tracking:     PT Received On: 09/30/22  PT Start Time: 1001     PT Stop Time: 1014  PT Total Time (min): 13 min     Billable Minutes: Evaluation 13 co-evaluation with OT      09/30/2022

## 2022-09-30 NOTE — PROGRESS NOTES
JEREMIAH spoke with Herlinda Pedro with the Office of Behavioral [facility] ( [phone]).  Ms. Pedro will evaluate patient today.  We discussed placement options that are being explored, e.g., group home placement vs. nursing home placement.  Ms. Pedro stated that [facility]  However, patient may need temporary placement in nursing facility while trying to find a group home.    JEREMIAH advised Ms. Pedro that we have limited information regarding patient's prior level of functioning and that an evaluation by PT has been requested.  JEREMIAH also advised Ms. Pedro that old records had been reviewed and they contained some diagnoses, e.g., panic disorder and schizoaffective disorder.  Ms. Pedro requested copies of those records.     JEREMIAH will submit current and old records to the agency ([facility]) for review.  Ms. Pedro will visit this date to evaluate the patient.      Addendum:  Correction-Ms. Kahn works with the [facility] ([facility]).

## 2022-09-30 NOTE — PT/OT/SLP EVAL
"Occupational Therapy   Evaluation and Discharge Note    Name: Miguelito Landin  MRN: [id number]  Admitting Diagnosis:  Dehydration with hypernatremia   Recent Surgery: * No surgery found *      Recommendations:     Discharge Recommendations: nursing facility, skilled  Discharge Equipment Recommendations:   (TBD at next level of care.)  Barriers to discharge:  None    Assessment:     Miguelito Landin is a 41 y.o. male with a medical diagnosis of Dehydration with hypernatremia. At this time, patient is functioning at their prior level of function and does not require further acute OT services.     Plan:     During this hospitalization, patient does not require further acute OT services.  Please re-consult if situation changes.    Plan of Care Reviewed with: patient    Subjective     Chief Complaint: Patient unwilling to engage  Patient/Family Comments/goals: Patient unable to state, no family present,     Occupational Profile:  Living Environment: Patient unable to provided Hx. Per chart Patient resided in private home, receiving full bed level care from his Father. Patient's Father passed away, no one aware of Patient/Father's demise. Patient alone bed fast x days no food/water, vulnerable in own waist until neighbors noted absence of Father, altered emergency response.    Previous level of function: Bed fast total assist.   Equipment Used at home:  wheelchair (Unable to obtain fullest, Patient inconsnstent historian.)  Assistance upon Discharge: None, placement for total care recommended.     Pain/Comfort:  Pain Rating 1:  (Very high limb guarding behaviors during all palpations BLEs, UB/LB ROM testing and repositioining. Patient responses "No, no, no!")    Patients cultural, spiritual, Alevism conflicts given the current situation: no    Objective:     Communicated with: RN prior to session.  Patient found  semi recumbent  with pressure relief boots (no lines attatched) upon OT entry to room.    General " Precautions: Standard, aspiration (skin/joint integrity)   Orthopedic Precautions:N/A   Braces: N/A  Respiratory Status: Room air     Occupational Performance:    Bed Mobility:  Total assist, poor tolerance for all handling/assessment/palpations and positioning.       Functional Mobility/Transfers: UT 2* (-) Patient behavioral responses. (D) prior to admit.     Activities of Daily Living:  Feeding assist required, caregivers informed.  Total assist all ADLs.     Cognitive/Visual Perceptual: Patient oriented to name, place, day of week.   Patient unable to follow 1-2 step direction with consistency.       Physical Exam:  Multiple UB contractures  RUE WNL > 90 of norm ranges. R hand high tone, marked extension digits  LUE AROM < 20% of norms available, contractures elbow and shoulder, ROM limited all planes. L hand flex contracture at wrist, digits extended, tone hands high. High limb guarding BUE/BLEs.     [facility] 6 Click ADL:  AMPAC Total Score: 6    Treatment & Education:  OT eval completed. Patient unwilling to engage  Post d/c long term care placement recommended.     Patient left HOB elevatedUB/BLE pillow support provided. 2hr turn schedule required, nail trim required.call button in upon abdomen, press pad call button recommended 2* max dexterity deficits. RN notified.     GOALS:   Multidisciplinary Problems       Occupational Therapy Goals       Not on file                    History:     Past Medical History:   Diagnosis Date    Anxiety     CP (cerebral palsy)     Spasticity        History reviewed. No pertinent surgical history.    Time Tracking:     OT Date of Treatment: 09/30/22  OT Start Time: 0947  OT Stop Time: 1016  OT Total Time (min): 29 min    Billable Minutes:Evaluation 20 9/30/2022

## 2022-09-30 NOTE — PROGRESS NOTES
"[facility] Medicine  Progress Note    Patient Name: Miguelito Landin  MRN: [id number]  Patient Class: IP- Inpatient   Admission Date: 2022  Length of Stay: 9 days  Attending Physician: Blade Santana MD  Primary Care Provider: Primary Doctor No        Subjective:     Principal Problem:Dehydration with hypernatremia        HPI:  Mr. Landin is a 40yo man with a past medical history of cerebral palsy with spasticity and anxiety.  At baseline, he is normally cared for by his father at home.  He is a poor historian and cannot state to me what happened today.    Dr. Spring, the ED staff, was able to gather some collateral from the EMS on arrival.  Apparently his father and he had not been seen for quite some time, so a care check was initiated.  On arrival, it was found that the patient's father had , and the patient was confined to his bed due to his mobility issues (bed-bound).  The last known contact with the patient and his father was 22.  In the interim, the patient has had no water or food, and had to languish in his own urine and stool until help arrived today.  The patient would not speak to me about his father's death despite my bringing it up directly.  All he will say repeatedly is, "something happened."  EMS did notify the patient on arrival that his father was , at which point he did become extremely upset.    In the ED his VS's were BP (!) 152/94   Pulse (!) 158 -> 127 -> 114   Temp 98 °F (36.7 °C)   Resp 19   Ht 6' (1.829 m)   Wt 68 kg (150 lb)   SpO2 (!) 94% RA  BMI 20.34 kg/m².  Labs showed WBC 18, Hg 19, HCT 54, .  , HCO3 14, BUN 17, Cr 1.2, AG 24.  Gluc 115, Ca 10.7, TB 2.4, normal LFT's.  CPK 43, LA 1.7, BHB 4.6.  COVID NEG. VBG pH 7.39, PCO2 30.     No radiographs were done in the ED.  (CXR now ordered and pending though).  EKG showed sinus tachycardia to 148 with MARYAM and non-specific ST changes.     In the ED he was treated with " a banana bag bolus , and NS 2L bolus .      Overview/Hospital Course:  Mr Miguelito Landin who is bedbound from cerebral palsy who was admitted with hypernatremia, hypercalcemia due to dehydration from lack of access to food/water after his caretaker/father . Electrolyte disturbances improved. Tolerating diet. He is reasonably having stress and anxiety related to this; PRN xanax available. Social work consulted for locating his next of kin (both parents , other family is in [facility]) and plans for his care going forward.  Patient became placement issue       Interval History:  no new issues    Review of Systems   Constitutional:  Positive for activity change. Negative for appetite change, chills, diaphoresis and fatigue.   HENT:  Negative for congestion and dental problem.    Eyes:  Negative for discharge and itching.   Respiratory:  Negative for apnea and chest tightness.    Cardiovascular:  Negative for chest pain and leg swelling.   Gastrointestinal:  Negative for abdominal distention and abdominal pain.   Endocrine: Negative for cold intolerance.   Genitourinary:  Negative for difficulty urinating and dysuria.   Musculoskeletal:  Negative for arthralgias and back pain.   Neurological:  Negative for dizziness.   Psychiatric/Behavioral:  Negative for agitation and behavioral problems.    Objective:     Vital Signs (Most Recent):  Temp: 97.9 °F (36.6 °C) (22)  Pulse: 88 (22)  Resp: 15 (22)  BP: 127/71 (22)  SpO2: 95 % (22) Vital Signs (24h Range):  Temp:  [97.8 °F (36.6 °C)-98.6 °F (37 °C)] 97.9 °F (36.6 °C)  Pulse:  [] 88  Resp:  [14-15] 15  SpO2:  [93 %-97 %] 95 %  BP: (119-138)/(67-97) 127/71     Weight: 64.5 kg (142 lb 3.2 oz)  Body mass index is 19.29 kg/m².    Intake/Output Summary (Last 24 hours) at 2022 0920  Last data filed at 2022 0600  Gross per 24 hour   Intake 300 ml   Output 900 ml   Net -600 ml       Physical Exam  Vitals and nursing note reviewed.   Constitutional:       General: He is not in acute distress.     Appearance: He is ill-appearing. He is not toxic-appearing.   HENT:      Head: Normocephalic and atraumatic.   Eyes:      Conjunctiva/sclera: Conjunctivae normal.   Cardiovascular:      Rate and Rhythm: Normal rate and regular rhythm.   Pulmonary:      Effort: Pulmonary effort is normal. No respiratory distress.   Abdominal:      General: There is no distension.   Skin:     General: Skin is warm and dry.   Neurological:      Mental Status: He is alert and oriented to person, place, and time.   Psychiatric:         Mood and Affect: Mood normal.         Behavior: Behavior normal.       Significant Labs: All pertinent labs within the past 24 hours have been reviewed.  CBC: No results for input(s): WBC, HGB, HCT, PLT in the last 48 hours.  CMP: No results for input(s): NA, K, CL, CO2, GLU, BUN, CREATININE, CALCIUM, PROT, ALBUMIN, BILITOT, ALKPHOS, AST, ALT, ANIONGAP, EGFRNONAA in the last 48 hours.    Invalid input(s): [place]    Significant Imaging:       Assessment/Plan:      * Dehydration with hypernatremia  Na 151 on admit due to dehydration from lack of PO access.  Resolved with IVF. Tolerating regular diet  DC IVF       Resolved           Onychomycosis  Will see if wound care can trim his toenails which are thickened and digging into his foot skin. Podiatry cannot.   Started terbinafine x2 weeks for treatment       Tinea pedis of both feet  Started terbinafine x2 weeks  Check CMP      Hypophosphatemia  Replace and monitor  Monitor for refeeding      Cerebral palsy  Consult social work for placement  Unsure if he has any family to care for him now that his father has - case management is working on this      Hypercalcemia  Due severe dehydration. Resolved with IVF        SIRS (systemic inflammatory response syndrome)  This was most likely from dehydration and has now resolved. No signs of  infection          VTE Risk Mitigation (From admission, onward)         Ordered     IP VTE LOW RISK PATIENT  Once         22     Place sequential compression device  Until discontinued         22     Place DAPHNE hose  Until discontinued         22                Discharge Planning   RADHA:      Code Status: Full Code   Is the patient medically ready for discharge?:     Reason for patient still in hospital (select all that apply):   Discharge Plan A: Other (TBD)   Discharge Delays: (!) Other (Parents are  and search for relatives is ongoing.)              Blade Jones MD  Department of Hospital Medicine   [facility] S[facility]

## 2022-09-30 NOTE — PLAN OF CARE
Problem: Adult Inpatient Plan of Care  Goal: Plan of Care Review  Outcome: Ongoing, Progressing  Goal: Patient-Specific Goal (Individualized)  Outcome: Ongoing, Progressing  Goal: Absence of Hospital-Acquired Illness or Injury  Outcome: Ongoing, Progressing  Goal: Optimal Comfort and Wellbeing  Outcome: Ongoing, Progressing  Goal: Readiness for Transition of Care  Outcome: Ongoing, Progressing     Problem: Infection  Goal: Absence of Infection Signs and Symptoms  Outcome: Ongoing, Progressing     Patient remained free from falls/injury throughout shift.  No acute changes in status.  Bed locked in lowest position.  Side rails up x2.  Call bell within reach.  Purposeful rounding maintained throughout shift.

## 2022-10-01 PROCEDURE — 11000001 HC ACUTE MED/SURG PRIVATE ROOM

## 2022-10-01 PROCEDURE — 25000003 PHARM REV CODE 250: Performed by: HOSPITALIST

## 2022-10-01 RX ADMIN — TERBINAFINE TABLETS 250 MG 250 MG: 250 TABLET ORAL at 08:10

## 2022-10-01 RX ADMIN — THERA TABS 1 TABLET: TAB at 08:10

## 2022-10-01 NOTE — PROGRESS NOTES
"[facility] Medicine  Progress Note    Patient Name: Miguelito Landin  MRN: [id number]  Patient Class: IP- Inpatient   Admission Date: 2022  Length of Stay: 10 days  Attending Physician: Blade Santana MD  Primary Care Provider: [facility]        Subjective:     Principal Problem:Dehydration with hypernatremia        HPI:  Mr. Landin is a 42yo man with a past medical history of cerebral palsy with spasticity and anxiety.  At baseline, he is normally cared for by his father at home.  He is a poor historian and cannot state to me what happened today.    Dr. Spring, the ED staff, was able to gather some collateral from the EMS on arrival.  Apparently his father and he had not been seen for quite some time, so a care check was initiated.  On arrival, it was found that the patient's father had , and the patient was confined to his bed due to his mobility issues (bed-bound).  The last known contact with the patient and his father was 22.  In the interim, the patient has had no water or food, and had to languish in his own urine and stool until help arrived today.  The patient would not speak to me about his father's death despite my bringing it up directly.  All he will say repeatedly is, "something happened."  EMS did notify the patient on arrival that his father was , at which point he did become extremely upset.    In the ED his VS's were BP (!) 152/94   Pulse (!) 158 -> 127 -> 114   Temp 98 °F (36.7 °C)   Resp 19   Ht 6' (1.829 m)   Wt 68 kg (150 lb)   SpO2 (!) 94% RA  BMI 20.34 kg/m².  Labs showed WBC 18, Hg 19, HCT 54, .  , HCO3 14, BUN 17, Cr 1.2, AG 24.  Gluc 115, Ca 10.7, TB 2.4, normal LFT's.  CPK 43, LA 1.7, BHB 4.6.  COVID NEG. VBG pH 7.39, PCO2 30.     No radiographs were done in the ED.  (CXR now ordered and pending though).  EKG showed sinus tachycardia to 148 with MARYAM and non-specific ST changes.     In the ED he was treated " with a banana bag bolus , and NS 2L bolus .      Overview/Hospital Course:  Mr Miguelito Landin who is bedbound from cerebral palsy who was admitted with hypernatremia, hypercalcemia due to dehydration from lack of access to food/water after his caretaker/father . Electrolyte disturbances improved. Tolerating diet. He is reasonably having stress and anxiety related to this; PRN xanax available. Social work consulted for locating his next of kin (both parents , other family is in [facility]) and plans for his care going forward.  Patient became placement issue       Interval History: No new issues     Review of Systems   Constitutional:  Positive for activity change. Negative for appetite change, chills, diaphoresis and fatigue.   HENT:  Negative for congestion and dental problem.    Eyes:  Negative for discharge and itching.   Respiratory:  Negative for apnea and chest tightness.    Cardiovascular:  Negative for chest pain and leg swelling.   Gastrointestinal:  Negative for abdominal distention and abdominal pain.   Endocrine: Negative for cold intolerance.   Genitourinary:  Negative for difficulty urinating and dysuria.   Musculoskeletal:  Negative for arthralgias and back pain.   Neurological:  Negative for dizziness.   Psychiatric/Behavioral:  Negative for agitation and behavioral problems.    Objective:     Vital Signs (Most Recent):  Temp: 97.9 °F (36.6 °C) (10/01/22 0806)  Pulse: 91 (10/01/22 0806)  Resp: 16 (10/01/22 0806)  BP: 107/64 (10/01/22 0806)  SpO2: 95 % (10/01/22 0806) Vital Signs (24h Range):  Temp:  [97.7 °F (36.5 °C)-98.7 °F (37.1 °C)] 97.9 °F (36.6 °C)  Pulse:  [] 91  Resp:  [14-18] 16  SpO2:  [93 %-96 %] 95 %  BP: (107-138)/(64-90) 107/64     Weight: 64.5 kg (142 lb 3.2 oz)  Body mass index is 19.29 kg/m².    Intake/Output Summary (Last 24 hours) at 10/1/2022 0941  Last data filed at 10/1/2022 0830  Gross per 24 hour   Intake 657 ml   Output 850 ml   Net -193 ml       Physical Exam  Vitals and nursing note reviewed.   Constitutional:       General: He is not in acute distress.     Appearance: He is ill-appearing. He is not toxic-appearing.   HENT:      Head: Normocephalic and atraumatic.   Eyes:      Conjunctiva/sclera: Conjunctivae normal.   Cardiovascular:      Rate and Rhythm: Normal rate and regular rhythm.   Pulmonary:      Effort: Pulmonary effort is normal. No respiratory distress.   Abdominal:      General: There is no distension.   Skin:     General: Skin is warm and dry.   Neurological:      Mental Status: He is alert and oriented to person, place, and time.   Psychiatric:         Mood and Affect: Mood normal.         Behavior: Behavior normal.       Significant Labs: All pertinent labs within the past 24 hours have been reviewed.  BMP: No results for input(s): GLU, NA, K, CL, CO2, BUN, CREATININE, CALCIUM, MG in the last 48 hours.  CBC: No results for input(s): WBC, HGB, HCT, PLT in the last 48 hours.    Significant Imaging:       Assessment/Plan:      * Dehydration with hypernatremia  Na 151 on admit due to dehydration from lack of PO access.  Resolved with IVF. Tolerating regular diet  DC IVF       Resolved           Onychomycosis  Will see if wound care can trim his toenails which are thickened and digging into his foot skin. Podiatry cannot.   Started terbinafine x2 weeks for treatment       Tinea pedis of both feet  Started terbinafine x2 weeks  Check CMP      Hypophosphatemia  Replace and monitor  Monitor for refeeding      Cerebral palsy  Consult social work for placement  Unsure if he has any family to care for him now that his father has - case management is working on this      Hypercalcemia  Due severe dehydration. Resolved with IVF        SIRS (systemic inflammatory response syndrome)  This was most likely from dehydration and has now resolved. No signs of infection          VTE Risk Mitigation (From admission, onward)         Ordered     IP VTE LOW  RISK PATIENT  Once         09/21/22 0433     Place sequential compression device  Until discontinued         09/21/22 0433     Place DAPHNE hose  Until discontinued         09/21/22 0433                Discharge Planning   RADHA:      Code Status: Full Code   Is the patient medically ready for discharge?:     Reason for patient still in hospital (select all that apply):   Discharge Plan A: Group Home ([facility] to assess for possible group home placement; [facility] ([facility]) also to assess.)   Discharge Delays: (!) Other (Attempting to locate next of kin.)              Blade Jones MD  Department of Hospital Medicine   [facility]

## 2022-10-01 NOTE — SUBJECTIVE & OBJECTIVE
Interval History: No new issues     Review of Systems   Constitutional:  Positive for activity change. Negative for appetite change, chills, diaphoresis and fatigue.   HENT:  Negative for congestion and dental problem.    Eyes:  Negative for discharge and itching.   Respiratory:  Negative for apnea and chest tightness.    Cardiovascular:  Negative for chest pain and leg swelling.   Gastrointestinal:  Negative for abdominal distention and abdominal pain.   Endocrine: Negative for cold intolerance.   Genitourinary:  Negative for difficulty urinating and dysuria.   Musculoskeletal:  Negative for arthralgias and back pain.   Neurological:  Negative for dizziness.   Psychiatric/Behavioral:  Negative for agitation and behavioral problems.    Objective:     Vital Signs (Most Recent):  Temp: 97.9 °F (36.6 °C) (10/01/22 0806)  Pulse: 91 (10/01/22 0806)  Resp: 16 (10/01/22 0806)  BP: 107/64 (10/01/22 0806)  SpO2: 95 % (10/01/22 0806) Vital Signs (24h Range):  Temp:  [97.7 °F (36.5 °C)-98.7 °F (37.1 °C)] 97.9 °F (36.6 °C)  Pulse:  [] 91  Resp:  [14-18] 16  SpO2:  [93 %-96 %] 95 %  BP: (107-138)/(64-90) 107/64     Weight: 64.5 kg (142 lb 3.2 oz)  Body mass index is 19.29 kg/m².    Intake/Output Summary (Last 24 hours) at 10/1/2022 0946  Last data filed at 10/1/2022 0830  Gross per 24 hour   Intake 657 ml   Output 850 ml   Net -193 ml      Physical Exam  Vitals and nursing note reviewed.   Constitutional:       General: He is not in acute distress.     Appearance: He is ill-appearing. He is not toxic-appearing.   HENT:      Head: Normocephalic and atraumatic.   Eyes:      Conjunctiva/sclera: Conjunctivae normal.   Cardiovascular:      Rate and Rhythm: Normal rate and regular rhythm.   Pulmonary:      Effort: Pulmonary effort is normal. No respiratory distress.   Abdominal:      General: There is no distension.   Skin:     General: Skin is warm and dry.   Neurological:      Mental Status: He is alert and oriented to person,  place, and time.   Psychiatric:         Mood and Affect: Mood normal.         Behavior: Behavior normal.       Significant Labs: All pertinent labs within the past 24 hours have been reviewed.  BMP: No results for input(s): GLU, NA, K, CL, CO2, BUN, CREATININE, CALCIUM, MG in the last 48 hours.  CBC: No results for input(s): WBC, HGB, HCT, PLT in the last 48 hours.    Significant Imaging:

## 2022-10-01 NOTE — PLAN OF CARE
Problem: Adult Inpatient Plan of Care  Goal: Plan of Care Review  Outcome: Ongoing, Not Progressing  Goal: Patient-Specific Goal (Individualized)  Outcome: Ongoing, Not Progressing  Goal: Absence of Hospital-Acquired Illness or Injury  Outcome: Ongoing, Not Progressing  Goal: Optimal Comfort and Wellbeing  Outcome: Ongoing, Not Progressing  Goal: Readiness for Transition of Care  Outcome: Ongoing, Not Progressing     Problem: Skin Injury Risk Increased  Goal: Skin Health and Integrity  Outcome: Ongoing, Not Progressing      Patient remained free from falls/injury throughout shift.  No acute changes in status.  Bed locked in lowest position.  Side rails up x2.  Call bell within reach.  Purposeful rounding maintained throughout shift.

## 2022-10-02 PROCEDURE — 25000003 PHARM REV CODE 250: Performed by: HOSPITALIST

## 2022-10-02 PROCEDURE — 25000003 PHARM REV CODE 250: Performed by: INTERNAL MEDICINE

## 2022-10-02 PROCEDURE — 11000001 HC ACUTE MED/SURG PRIVATE ROOM

## 2022-10-02 RX ADMIN — TERBINAFINE TABLETS 250 MG 250 MG: 250 TABLET ORAL at 08:10

## 2022-10-02 RX ADMIN — AMLODIPINE BESYLATE 10 MG: 5 TABLET ORAL at 08:10

## 2022-10-02 RX ADMIN — THERA TABS 1 TABLET: TAB at 08:10

## 2022-10-03 PROCEDURE — 11000001 HC ACUTE MED/SURG PRIVATE ROOM

## 2022-10-03 PROCEDURE — 25000003 PHARM REV CODE 250: Performed by: INTERNAL MEDICINE

## 2022-10-03 PROCEDURE — 25000003 PHARM REV CODE 250: Performed by: HOSPITALIST

## 2022-10-03 RX ADMIN — TERBINAFINE TABLETS 250 MG 250 MG: 250 TABLET ORAL at 09:10

## 2022-10-03 RX ADMIN — AMLODIPINE BESYLATE 10 MG: 5 TABLET ORAL at 09:10

## 2022-10-03 RX ADMIN — POLYETHYLENE GLYCOL 3350 17 G: 17 POWDER, FOR SOLUTION ORAL at 09:10

## 2022-10-03 RX ADMIN — THERA TABS 1 TABLET: TAB at 09:10

## 2022-10-03 NOTE — PROGRESS NOTES
"[facility] Medicine  Progress Note    Patient Name: Miguelito Landin  MRN: [id number]  Patient Class: IP- Inpatient   Admission Date: 2022  Length of Stay: 12 days  Attending Physician: Blade Santana MD  Primary Care Provider: [facility]        Subjective:     Principal Problem:Dehydration with hypernatremia        HPI:  Mr. Landin is a 42yo man with a past medical history of cerebral palsy with spasticity and anxiety.  At baseline, he is normally cared for by his father at home.  He is a poor historian and cannot state to me what happened today.    Dr. Spring, the ED staff, was able to gather some collateral from the EMS on arrival.  Apparently his father and he had not been seen for quite some time, so a care check was initiated.  On arrival, it was found that the patient's father had , and the patient was confined to his bed due to his mobility issues (bed-bound).  The last known contact with the patient and his father was 22.  In the interim, the patient has had no water or food, and had to languish in his own urine and stool until help arrived today.  The patient would not speak to me about his father's death despite my bringing it up directly.  All he will say repeatedly is, "something happened."  EMS did notify the patient on arrival that his father was , at which point he did become extremely upset.    In the ED his VS's were BP (!) 152/94   Pulse (!) 158 -> 127 -> 114   Temp 98 °F (36.7 °C)   Resp 19   Ht 6' (1.829 m)   Wt 68 kg (150 lb)   SpO2 (!) 94% RA  BMI 20.34 kg/m².  Labs showed WBC 18, Hg 19, HCT 54, .  , HCO3 14, BUN 17, Cr 1.2, AG 24.  Gluc 115, Ca 10.7, TB 2.4, normal LFT's.  CPK 43, LA 1.7, BHB 4.6.  COVID NEG. VBG pH 7.39, PCO2 30.     No radiographs were done in the ED.  (CXR now ordered and pending though).  EKG showed sinus tachycardia to 148 with MARYAM and non-specific ST changes.     In the ED he was treated " with a banana bag bolus , and NS 2L bolus .      Overview/Hospital Course:  Mr Miguelito Landin who is bedbound from cerebral palsy who was admitted with hypernatremia, hypercalcemia due to dehydration from lack of access to food/water after his caretaker/father . Electrolyte disturbances improved. Tolerating diet. He is reasonably having stress and anxiety related to this; PRN xanax available. Social work consulted for locating his next of kin (both parents , other family is in [facility]) and plans for his care going forward.  Patient became placement issue       Interval History:  No new issues     Review of Systems   Constitutional:  Positive for activity change. Negative for appetite change, chills, diaphoresis and fatigue.   HENT:  Negative for congestion and dental problem.    Eyes:  Negative for discharge and itching.   Respiratory:  Negative for apnea and chest tightness.    Cardiovascular:  Negative for chest pain and leg swelling.   Gastrointestinal:  Negative for abdominal distention and abdominal pain.   Endocrine: Negative for cold intolerance.   Genitourinary:  Negative for difficulty urinating and dysuria.   Musculoskeletal:  Negative for arthralgias and back pain.   Neurological:  Negative for dizziness.   Psychiatric/Behavioral:  Negative for agitation and behavioral problems.    Objective:     Vital Signs (Most Recent):  Temp: 97.7 °F (36.5 °C) (10/03/22 0816)  Pulse: 81 (10/03/22 0816)  Resp: 14 (10/03/22 0816)  BP: 126/76 (10/03/22 0816)  SpO2: 95 % (10/03/22 0816) Vital Signs (24h Range):  Temp:  [97.5 °F (36.4 °C)-98.3 °F (36.8 °C)] 97.7 °F (36.5 °C)  Pulse:  [81-99] 81  Resp:  [14-18] 14  SpO2:  [94 %-96 %] 95 %  BP: ()/(59-96) 126/76     Weight: 64.5 kg (142 lb 3.2 oz)  Body mass index is 19.29 kg/m².    Intake/Output Summary (Last 24 hours) at 10/3/2022 5949  Last data filed at 10/3/2022 3528  Gross per 24 hour   Intake 600 ml   Output 750 ml   Net -150 ml       Physical Exam  Vitals and nursing note reviewed.   Constitutional:       General: He is not in acute distress.     Appearance: He is ill-appearing. He is not toxic-appearing.   HENT:      Head: Normocephalic and atraumatic.   Eyes:      Conjunctiva/sclera: Conjunctivae normal.   Cardiovascular:      Rate and Rhythm: Normal rate and regular rhythm.   Pulmonary:      Effort: Pulmonary effort is normal. No respiratory distress.   Abdominal:      General: There is no distension.   Skin:     General: Skin is warm and dry.   Neurological:      Mental Status: He is alert and oriented to person, place, and time.   Psychiatric:         Mood and Affect: Mood normal.         Behavior: Behavior normal.       Significant Labs: All pertinent labs within the past 24 hours have been reviewed.  BMP: No results for input(s): GLU, NA, K, CL, CO2, BUN, CREATININE, CALCIUM, MG in the last 48 hours.  CBC: No results for input(s): WBC, HGB, HCT, PLT in the last 48 hours.    Significant Imaging:       Assessment/Plan:      * Dehydration with hypernatremia  Na 151 on admit due to dehydration from lack of PO access.  Resolved with IVF. Tolerating regular diet  DC IVF       Resolved           Onychomycosis  Will see if wound care can trim his toenails which are thickened and digging into his foot skin. Podiatry cannot.   Started terbinafine x2 weeks for treatment       Tinea pedis of both feet  Started terbinafine x2 weeks  Check CMP      Hypophosphatemia  Replace and monitor  Monitor for refeeding      Cerebral palsy  Consult social work for placement  Unsure if he has any family to care for him now that his father has - case management is working on this      Hypercalcemia  Due severe dehydration. Resolved with IVF        SIRS (systemic inflammatory response syndrome)  This was most likely from dehydration and has now resolved. No signs of infection          VTE Risk Mitigation (From admission, onward)         Ordered     IP VTE LOW  RISK PATIENT  Once         09/21/22 0433     Place sequential compression device  Until discontinued         09/21/22 0433     Place DAPHNE hose  Until discontinued         09/21/22 0433                Discharge Planning   RADHA:      Code Status: Full Code   Is the patient medically ready for discharge?:     Reason for patient still in hospital (select all that apply):  Discharge Plan A: Group Home ([facility] to assess for possible group home placement; [facility] ([facility]) also to assess.)   Discharge Delays: (!) Other (Attempting to locate next of kin.)    Awaiting placement             Blade Jones MD  Department of Hospital Medicine   [facility]

## 2022-10-03 NOTE — PLAN OF CARE
Problem: Adult Inpatient Plan of Care  Goal: Optimal Comfort and Wellbeing  Outcome: Ongoing, Progressing     Problem: Infection  Goal: Absence of Infection Signs and Symptoms  Outcome: Ongoing, Progressing     Problem: Skin Injury Risk Increased  Goal: Skin Health and Integrity  Outcome: Ongoing, Progressing

## 2022-10-03 NOTE — SUBJECTIVE & OBJECTIVE
Interval History:  No new issues     Review of Systems   Constitutional:  Positive for activity change. Negative for appetite change, chills, diaphoresis and fatigue.   HENT:  Negative for congestion and dental problem.    Eyes:  Negative for discharge and itching.   Respiratory:  Negative for apnea and chest tightness.    Cardiovascular:  Negative for chest pain and leg swelling.   Gastrointestinal:  Negative for abdominal distention and abdominal pain.   Endocrine: Negative for cold intolerance.   Genitourinary:  Negative for difficulty urinating and dysuria.   Musculoskeletal:  Negative for arthralgias and back pain.   Neurological:  Negative for dizziness.   Psychiatric/Behavioral:  Negative for agitation and behavioral problems.    Objective:     Vital Signs (Most Recent):  Temp: 97.7 °F (36.5 °C) (10/03/22 0816)  Pulse: 81 (10/03/22 0816)  Resp: 14 (10/03/22 0816)  BP: 126/76 (10/03/22 0816)  SpO2: 95 % (10/03/22 0816) Vital Signs (24h Range):  Temp:  [97.5 °F (36.4 °C)-98.3 °F (36.8 °C)] 97.7 °F (36.5 °C)  Pulse:  [81-99] 81  Resp:  [14-18] 14  SpO2:  [94 %-96 %] 95 %  BP: ()/(59-96) 126/76     Weight: 64.5 kg (142 lb 3.2 oz)  Body mass index is 19.29 kg/m².    Intake/Output Summary (Last 24 hours) at 10/3/2022 08  Last data filed at 10/3/2022 0439  Gross per 24 hour   Intake 600 ml   Output 750 ml   Net -150 ml      Physical Exam  Vitals and nursing note reviewed.   Constitutional:       General: He is not in acute distress.     Appearance: He is ill-appearing. He is not toxic-appearing.   HENT:      Head: Normocephalic and atraumatic.   Eyes:      Conjunctiva/sclera: Conjunctivae normal.   Cardiovascular:      Rate and Rhythm: Normal rate and regular rhythm.   Pulmonary:      Effort: Pulmonary effort is normal. No respiratory distress.   Abdominal:      General: There is no distension.   Skin:     General: Skin is warm and dry.   Neurological:      Mental Status: He is alert and oriented to person,  place, and time.   Psychiatric:         Mood and Affect: Mood normal.         Behavior: Behavior normal.       Significant Labs: All pertinent labs within the past 24 hours have been reviewed.  BMP: No results for input(s): GLU, NA, K, CL, CO2, BUN, CREATININE, CALCIUM, MG in the last 48 hours.  CBC: No results for input(s): WBC, HGB, HCT, PLT in the last 48 hours.    Significant Imaging:

## 2022-10-03 NOTE — PROGRESS NOTES
[facility] - [place] - Med Surg  Adult Nutrition   Progress Note (Follow-Up)    SUMMARY       Recommendations     1) Continue Regular Diet - as tolerated, encourage PO, Fluid consumption  2) Continue Boost (+) TID to assist in meeting pt energy and protein needs  3) Monitor Nutrition related labs     Goals:   1) Pt to maintain adequate PO until discharge    Nutrition Goal Status: Goal met  Communication of RD Recs: (POC)  Nutritition is signing off, reconsult for further nutritional needs     Assessment and Plan  Nutrition Problem  Increased Energy Needs     Related to (etiology):   Cerebral Palsy     Signs and Symptoms (as evidenced by):   Muscle wasting & contractures, SQ fat loss     Interventions/Recommendations (treatment strategy):  Commercial Beverage  Collaboration of care with other providers     Nutrition Diagnosis Status:   Continues       Malnutrition Assessment:  Malnutrition Type: acute illness or injury, chronic illness  Energy Intake: severe energy intake  Nails (Micronutrient):  (Long and unkept)  Teeth (Micronutrient): carries  Neck/Chest (Micronutrient): muscle wasting, subcutaneous fat loss  Musculoskeletal/Lower Extremities: muscle wasting, subcutaneous fat loss       Weight Loss (Malnutrition):  (WILLARD - No weight hx, pt poor historian)  Energy Intake (Malnutrition): less than 75% for greater than 7 days  Subcutaneous Fat (Malnutrition): moderate depletion  Muscle Mass (Malnutrition): moderate depletion       Jainism Region (Muscle Loss): moderate depletion  Clavicle and Acromion Bone Region (Muscle Loss): moderate depletion  Dorsal Hand (Muscle Loss): severe depletion           Reason for Assessment    Reason For Assessment: RD follow-up  Diagnosis:  (Dehydration with hypernatermia)  Relevant Medical History:   Patient Active Problem List   Diagnosis    Dehydration with hypernatremia    SIRS (systemic inflammatory response syndrome)    Hypercalcemia    Cerebral palsy    Hypophosphatemia    Tinea pedis of  both feet    Onychomycosis     General Information Comments:   10/3 - PO intake remains %. Pt reports no difficulty swallowing and consumption of about 2 boosts x day. Waiting for next of kin to be contact for discharge. Due to continued stability, no new recommendations at this time. Nutrition signing off, please reconsult if any further nutritional needs arise.     - PO intake improved 75 - 100%, dehydration resolved via IVF, hypophosphatemia - replete PRN. No current signs of refeeding, will continue to monitor.      - Pt presented to ED via EMS after wellness check at pt home. Pt father and pt had not been seen for several days - upon check of home pt father and primary caregiver was found to be . Pt is bed bound 2' cerebral palsy and during this time was without access to food and water. Upon arrival pt was dehydrated and hypernatremic - provided with IVF and derangements improved. % of meals thus far. Will continue to monitor.     Nutrition Risk Screen    Nutrition Risk Screen: no indicators present    Nutrition/Diet History    Spiritual, Cultural Beliefs, Islam Practices, Values that Affect Care: no  Food Allergies: NKFA  Factors Affecting Nutritional Intake: inability to feed self, impaired cognitive status/motor control    Anthropometrics    Temp: 97.8 °F (36.6 °C)  Height Method: Estimated  Height: 6' (182.9 cm)  Height (inches): 72 in  Weight Method: Bed Scale  Weight: 64.5 kg (142 lb 3.2 oz)  Weight (lb): 142.2 lb  Ideal Body Weight (IBW), Male: 178 lb  % Ideal Body Weight, Male (lb): 79.89 %  BMI (Calculated): 19.3  BMI Grade: 18.5-24.9 - normal       Weight History:  Wt Readings from Last 6 Encounters:   22 64.5 kg (142 lb 3.2 oz)     Lab/Procedures/Meds: Pertinent Labs Reviewed  CBC:  No results for input(s): WBC, HGB, HCT, PLT in the last 24 hours.  CMP:  No results for input(s): GLUCOSE, CALCIUM, ALBUMIN, PROT, NA, K, CO2, CL, BUN, CREATININE, ALKPHOS, ALT, AST,  BILITOT in the last 24 hours.      Medications:Pertinent Medications Reviewed  Scheduled Meds:   amLODIPine  10 mg Oral Daily    multivitamin  1 tablet Oral Daily    polyethylene glycol  17 g Oral Daily    terbinafine HCL  250 mg Oral Daily     Continuous Infusions:  PRN Meds:.acetaminophen, ALPRAZolam, aluminum-magnesium hydroxide-simethicone, dextrose 50%, dextrose 50%, glucagon (human recombinant), glucose, glucose, melatonin, naloxone, ondansetron, prochlorperazine, senna-docusate 8.6-50 mg, simethicone, sodium chloride 0.9%    Estimated/Assessed Needs    Weight Used For Calorie Calculations: 64.5 kg (142 lb 3.2 oz)  Energy Calorie Requirements (kcal): 3074-2675  Energy Need Method:  (35-40 per malnutrition; CP;spastic)  Protein Requirements: 97g  Weight Used For Protein Calculations: 64.5 kg (142 lb 3.2 oz)  Fluid Requirements (mL): 1 mL/kcal  Estimated Fluid Requirement Method:  (or per MD)    Nutrition Prescription Ordered    Current Diet Order: Regular  Oral Nutrition Supplement: Boost Plus TID    Evaluation of Received Nutrient/Fluid Intake    I/O: -0.4L  Energy Calories Required: meeting needs  Protein Required: meeting needs  Fluid Required: meeting needs  Comments: LBM 10/2  % Intake of Estimated Energy Needs: 75 - 100 %  % Meal Intake: 75 - 100 %  Intake/Output - Last 3 Shifts         10/02 0700  10/03 0659 10/03 0700  10/04 0659 10/04 0700  10/05 0659    P.O. 840 720 240    Total Intake(mL/kg) 840 (13) 720 (11.2) 240 (3.7)    Urine (mL/kg/hr) 750 (0.5) 1350 (0.9)     Other  0     Stool 0 0     Total Output 750 1350     Net +90 -630 +240           Urine Occurrence 1 x      Stool Occurrence 1 x 1 x              Nutrition Risk    Level of Risk/Frequency of Follow-up:  (no follow up)     Monitor and Evaluation    Food and Nutrient Intake: energy intake  Food and Nutrient Adminstration: diet order  Knowledge/Beliefs/Attitudes: beliefs and attitudes, food and nutrition knowledge/skill  Physical Activity and  Function: nutrition-related ADLs and IADLs, factors affecting access to physical activity  Anthropometric Measurements: weight change, weight  Biochemical Data, Medical Tests and Procedures: electrolyte and renal panel, inflammatory profile  Nutrition-Focused Physical Findings: overall appearance, extremities, muscles and bones     Nutrition Follow-Up    RD Follow-up?: Yes  CHRISTOPHER Garcia, Dietetic Intern

## 2022-10-03 NOTE — PLAN OF CARE
Shazia from [facility] called to inquire about placement for patient. Shazia inquired about who will sign paperwork and provide financials. JEREMIAH informed Shazia that the search for family members is still onging. JEREMIAH informed Shazia that she will keep her updated.

## 2022-10-04 LAB
ALBUMIN SERPL BCP-MCNC: 3.7 G/DL (ref 3.5–5.2)
ALP SERPL-CCNC: 124 U/L (ref 55–135)
ALT SERPL W/O P-5'-P-CCNC: 50 U/L (ref 10–44)
ANION GAP SERPL CALC-SCNC: 7 MMOL/L (ref 8–16)
AST SERPL-CCNC: 30 U/L (ref 10–40)
BILIRUB SERPL-MCNC: 0.6 MG/DL (ref 0.1–1)
BUN SERPL-MCNC: 18 MG/DL (ref 6–20)
CALCIUM SERPL-MCNC: 10.1 MG/DL (ref 8.7–10.5)
CHLORIDE SERPL-SCNC: 108 MMOL/L (ref 95–110)
CO2 SERPL-SCNC: 26 MMOL/L (ref 23–29)
CREAT SERPL-MCNC: 1 MG/DL (ref 0.5–1.4)
EST. GFR  (NO RACE VARIABLE): >60 ML/MIN/1.73 M^2
GLUCOSE SERPL-MCNC: 148 MG/DL (ref 70–110)
PHOSPHATE SERPL-MCNC: 3 MG/DL (ref 2.7–4.5)
POTASSIUM SERPL-SCNC: 4.2 MMOL/L (ref 3.5–5.1)
PROT SERPL-MCNC: 7.8 G/DL (ref 6–8.4)
SODIUM SERPL-SCNC: 141 MMOL/L (ref 136–145)

## 2022-10-04 PROCEDURE — 25000003 PHARM REV CODE 250: Performed by: INTERNAL MEDICINE

## 2022-10-04 PROCEDURE — 84100 ASSAY OF PHOSPHORUS: CPT | Performed by: STUDENT IN AN ORGANIZED HEALTH CARE EDUCATION/TRAINING PROGRAM

## 2022-10-04 PROCEDURE — 80053 COMPREHEN METABOLIC PANEL: CPT | Performed by: STUDENT IN AN ORGANIZED HEALTH CARE EDUCATION/TRAINING PROGRAM

## 2022-10-04 PROCEDURE — 36415 COLL VENOUS BLD VENIPUNCTURE: CPT | Performed by: STUDENT IN AN ORGANIZED HEALTH CARE EDUCATION/TRAINING PROGRAM

## 2022-10-04 PROCEDURE — 11000001 HC ACUTE MED/SURG PRIVATE ROOM

## 2022-10-04 PROCEDURE — 25000003 PHARM REV CODE 250: Performed by: HOSPITALIST

## 2022-10-04 RX ADMIN — POLYETHYLENE GLYCOL 3350 17 G: 17 POWDER, FOR SOLUTION ORAL at 08:10

## 2022-10-04 RX ADMIN — AMLODIPINE BESYLATE 10 MG: 5 TABLET ORAL at 08:10

## 2022-10-04 RX ADMIN — TERBINAFINE TABLETS 250 MG 250 MG: 250 TABLET ORAL at 08:10

## 2022-10-04 RX ADMIN — THERA TABS 1 TABLET: TAB at 08:10

## 2022-10-04 NOTE — ASSESSMENT & PLAN NOTE
-Na 151 on admit due to dehydration from lack of PO access.  -Na 141 on 09/23  -Resolved with IVF. Tolerating regular diet  -No need for further labs at this time

## 2022-10-04 NOTE — PROGRESS NOTES
"[facility] Medicine  Progress Note    Patient Name: Miguelito Landin  MRN: [id number]  Patient Class: IP- Inpatient   Admission Date: 2022  Length of Stay: 13 days  Attending Physician: Stephenie Pappas DO  Primary Care Provider: Ventura Allan        Subjective:     Principal Problem:Dehydration with hypernatremia        HPI:  Mr. Landin is a 40yo man with a past medical history of cerebral palsy with spasticity and anxiety.  At baseline, he is normally cared for by his father at home.  He is a poor historian and cannot state to me what happened today.    Dr. Spring, the ED staff, was able to gather some collateral from the EMS on arrival.  Apparently his father and he had not been seen for quite some time, so a care check was initiated.  On arrival, it was found that the patient's father had , and the patient was confined to his bed due to his mobility issues (bed-bound).  The last known contact with the patient and his father was 22.  In the interim, the patient has had no water or food, and had to languish in his own urine and stool until help arrived today.  The patient would not speak to me about his father's death despite my bringing it up directly.  All he will say repeatedly is, "something happened."  EMS did notify the patient on arrival that his father was , at which point he did become extremely upset.    In the ED his VS's were BP (!) 152/94   Pulse (!) 158 -> 127 -> 114   Temp 98 °F (36.7 °C)   Resp 19   Ht 6' (1.829 m)   Wt 68 kg (150 lb)   SpO2 (!) 94% RA  BMI 20.34 kg/m².  Labs showed WBC 18, Hg 19, HCT 54, .  , HCO3 14, BUN 17, Cr 1.2, AG 24.  Gluc 115, Ca 10.7, TB 2.4, normal LFT's.  CPK 43, LA 1.7, BHB 4.6.  COVID NEG. VBG pH 7.39, PCO2 30.     No radiographs were done in the ED.  (CXR now ordered and pending though).  EKG showed sinus tachycardia to 148 with MARYAM and non-specific ST changes.     In the ED he was treated with a " banana bag bolus , and NS 2L bolus .      Overview/Hospital Course:  Mr Miguelito Landin who is bedbound from cerebral palsy who was admitted with hypernatremia, hypercalcemia due to dehydration from lack of access to food/water after his caretaker/father . Electrolyte disturbances improved. Tolerating diet. He is reasonably having stress and anxiety related to this; PRN xanax available. Social work consulted for locating his next of kin (both parents , other family is in [facility]) and plans for his care going forward.  Patient became placement issue       Interval History:  No acute overnight events.  Patient remained afebrile.  Patient is minimally verbal.  Answers yes and no to questions.  Denies any pain at this time.  Able to tell me that he is in the hospital at [facility]    Review of Systems   Constitutional:  Negative for chills and fever.   Eyes:  Negative for visual disturbance.   Respiratory:  Negative for cough and shortness of breath.    Cardiovascular:  Negative for chest pain.   Gastrointestinal:  Negative for abdominal pain, nausea and vomiting.   Neurological:  Negative for dizziness and headaches.     Objective:     Vital Signs (Most Recent):  Temp: 97.3 °F (36.3 °C) (10/04/22 1140)  Pulse: 104 (10/04/22 1140)  Resp: 17 (10/04/22 1140)  BP: 124/72 (10/04/22 1140)  SpO2: (!) 93 % (10/04/22 1140)   Vital Signs (24h Range):  Temp:  [97 °F (36.1 °C)-99 °F (37.2 °C)] 97.3 °F (36.3 °C)  Pulse:  [] 104  Resp:  [14-18] 17  SpO2:  [93 %-95 %] 93 %  BP: (123-143)/(72-90) 124/72     Weight: 64.5 kg (142 lb 3.2 oz)  Body mass index is 19.29 kg/m².    Intake/Output Summary (Last 24 hours) at 10/4/2022 1226  Last data filed at 10/4/2022 0820  Gross per 24 hour   Intake 480 ml   Output 1350 ml   Net -870 ml      Physical Exam  Vitals and nursing note reviewed.   Constitutional:       General: He is not in acute distress.     Appearance: He is ill-appearing (chronically ill appearing).  He is not toxic-appearing.   HENT:      Head: Atraumatic.      Mouth/Throat:      Mouth: Mucous membranes are moist.   Eyes:      Conjunctiva/sclera: Conjunctivae normal.   Cardiovascular:      Rate and Rhythm: Normal rate and regular rhythm.   Pulmonary:      Effort: Pulmonary effort is normal. No respiratory distress.      Breath sounds: No wheezing or rales.   Abdominal:      General: There is no distension.      Tenderness: There is no abdominal tenderness.   Skin:     General: Skin is warm and dry.      Comments: Toenails with onychomycosis    Neurological:      Mental Status: He is alert. Mental status is at baseline.      Comments: Hx of cerebral palsy. Able to answer yes and no to questions. Say he's doing okay   Psychiatric:         Mood and Affect: Mood normal.         Behavior: Behavior normal.       Significant Labs: All pertinent labs within the past 24 hours have been reviewed.    Significant Imaging: I have reviewed all pertinent imaging results/findings within the past 24 hours.      Assessment/Plan:      * Dehydration with hypernatremia  -Na 151 on admit due to dehydration from lack of PO access.  -Na 141 on   -Resolved with IVF. Tolerating regular diet  -No need for further labs at this time        Hypercalcemia  Ca 10.7 on admit  Due severe dehydration.   Ca 8.9 on   Resolved with IVF        Hypophosphatemia  Replace and monitor  Monitor for refeeding      Onychomycosis  Podiatry unable to trim toenails   Wound care trim toe nails.   Started terbinafine x2 weeks for treatment       Tinea pedis of both feet  Started terbinafine x2 weeks  Check CMP      Cerebral palsy  Consult social work for placement  Unsure if he has any family to care for him now that his father has - case management is working on this      SIRS (systemic inflammatory response syndrome)  This was most likely from dehydration and has now resolved.   No signs of infection          VTE Risk Mitigation (From admission,  onward)         Ordered     IP VTE LOW RISK PATIENT  Once         09/21/22 0433     Place sequential compression device  Until discontinued         09/21/22 0433     Place DAPHNE hose  Until discontinued         09/21/22 0433                Discharge Planning   RADHA:      Code Status: Full Code   Is the patient medically ready for discharge?:     Reason for patient still in hospital (select all that apply): Treatment and Pending disposition  Discharge Plan A: Group Home   Discharge Delays: (!) Other (Attempting to locate next of kin.)              Stephenie Pappas DO  Department of Hospital Medicine   [facility] Surg

## 2022-10-04 NOTE — PLAN OF CARE
Problem: Adult Inpatient Plan of Care  Goal: Plan of Care Review  10/4/2022 1751 by Fide Hopson RN  Outcome: Ongoing, Progressing  Flowsheets (Taken 10/4/2022 1720)  Plan of Care Reviewed With: patient  10/4/2022 1720 by Fide Hopson RN  Outcome: Ongoing, Progressing  Goal: Patient-Specific Goal (Individualized)  10/4/2022 1751 by Fide Hopson RN  Outcome: Ongoing, Progressing  10/4/2022 1720 by Fide Hopson RN  Outcome: Ongoing, Progressing  Goal: Absence of Hospital-Acquired Illness or Injury  10/4/2022 1751 by Fide Hopson RN  Outcome: Ongoing, Progressing  10/4/2022 1720 by Fide Hopson RN  Outcome: Ongoing, Progressing  Intervention: Identify and Manage Fall Risk  Flowsheets (Taken 10/4/2022 1720)  Safety Promotion/Fall Prevention:   bed alarm set   assistive device/personal item within reach   /camera at bedside  Intervention: Prevent Skin Injury  Flowsheets (Taken 10/4/2022 1720)  Body Position: weight shifting  Skin Protection: incontinence pads utilized  Intervention: Prevent and Manage VTE (Venous Thromboembolism) Risk  Flowsheets (Taken 10/4/2022 1720)  Activity Management: Arm raise - L1  VTE Prevention/Management: ROM (active) performed  Intervention: Prevent Infection  Flowsheets (Taken 10/4/2022 1720)  Infection Prevention: single patient room provided  Goal: Optimal Comfort and Wellbeing  10/4/2022 1751 by Fide Hopson RN  Outcome: Ongoing, Progressing  10/4/2022 1720 by Fide Hopson RN  Outcome: Ongoing, Progressing  Intervention: Monitor Pain and Promote Comfort  Flowsheets (Taken 10/4/2022 1720)  Pain Management Interventions:   quiet environment facilitated   care clustered   pillow support provided  Intervention: Provide Person-Centered Care  Flowsheets (Taken 10/4/2022 1751)  Trust Relationship/Rapport: choices provided  Goal: Readiness for Transition of Care  10/4/2022 1751 by Fide Hopson RN  Outcome: Ongoing,  Progressing  10/4/2022 1720 by Fide Hopson RN  Outcome: Ongoing, Progressing     Problem: Infection  Goal: Absence of Infection Signs and Symptoms  10/4/2022 1751 by Fide Hopson RN  Outcome: Ongoing, Progressing  10/4/2022 1720 by Fide Hopson RN  Outcome: Ongoing, Progressing     Problem: Skin Injury Risk Increased  Goal: Skin Health and Integrity  10/4/2022 1751 by Fide Hopson RN  Outcome: Ongoing, Progressing  10/4/2022 1720 by Fide Hopson RN  Outcome: Ongoing, Progressing  Intervention: Optimize Skin Protection  Flowsheets (Taken 10/4/2022 1751)  Pressure Reduction Techniques: weight shift assistance provided  Pressure Reduction Devices: heel offloading device utilized  Head of Bed (HOB) Positioning: HOB at 30-45 degrees     Problem: Fall Injury Risk  Goal: Absence of Fall and Fall-Related Injury  10/4/2022 1751 by Fide Hopson RN  Outcome: Ongoing, Progressing  10/4/2022 1720 by Fide Hopson RN  Outcome: Ongoing, Progressing

## 2022-10-04 NOTE — NURSING
Report given to oncoming nurse SYLVIE Elizalde at bedside. NAD noted. Safety precautions maintained. Call bell in reach.   Chart check completed.

## 2022-10-04 NOTE — SUBJECTIVE & OBJECTIVE
Interval History:  No acute overnight events.  Patient remained afebrile.  Patient is minimally verbal.  Answers yes and no to questions.  Denies any pain at this time.  Able to tell me that he is in the hospital at [facility]    Review of Systems   Constitutional:  Negative for chills and fever.   Eyes:  Negative for visual disturbance.   Respiratory:  Negative for cough and shortness of breath.    Cardiovascular:  Negative for chest pain.   Gastrointestinal:  Negative for abdominal pain, nausea and vomiting.   Neurological:  Negative for dizziness and headaches.     Objective:     Vital Signs (Most Recent):  Temp: 97.3 °F (36.3 °C) (10/04/22 1140)  Pulse: 104 (10/04/22 1140)  Resp: 17 (10/04/22 1140)  BP: 124/72 (10/04/22 1140)  SpO2: (!) 93 % (10/04/22 1140)   Vital Signs (24h Range):  Temp:  [97 °F (36.1 °C)-99 °F (37.2 °C)] 97.3 °F (36.3 °C)  Pulse:  [] 104  Resp:  [14-18] 17  SpO2:  [93 %-95 %] 93 %  BP: (123-143)/(72-90) 124/72     Weight: 64.5 kg (142 lb 3.2 oz)  Body mass index is 19.29 kg/m².    Intake/Output Summary (Last 24 hours) at 10/4/2022 1226  Last data filed at 10/4/2022 0820  Gross per 24 hour   Intake 480 ml   Output 1350 ml   Net -870 ml      Physical Exam  Vitals and nursing note reviewed.   Constitutional:       General: He is not in acute distress.     Appearance: He is ill-appearing (chronically ill appearing). He is not toxic-appearing.   HENT:      Head: Atraumatic.      Mouth/Throat:      Mouth: Mucous membranes are moist.   Eyes:      Conjunctiva/sclera: Conjunctivae normal.   Cardiovascular:      Rate and Rhythm: Normal rate and regular rhythm.   Pulmonary:      Effort: Pulmonary effort is normal. No respiratory distress.      Breath sounds: No wheezing or rales.   Abdominal:      General: There is no distension.      Tenderness: There is no abdominal tenderness.   Skin:     General: Skin is warm and dry.      Comments: Toenails with onychomycosis    Neurological:      Mental  Status: He is alert. Mental status is at baseline.      Comments: Hx of cerebral palsy. Able to answer yes and no to questions. Say he's doing okay   Psychiatric:         Mood and Affect: Mood normal.         Behavior: Behavior normal.       Significant Labs: All pertinent labs within the past 24 hours have been reviewed.    Significant Imaging: I have reviewed all pertinent imaging results/findings within the past 24 hours.

## 2022-10-04 NOTE — ASSESSMENT & PLAN NOTE
Podiatry unable to trim toenails   Wound care trim toe nails.   Started terbinafine x2 weeks for treatment

## 2022-10-04 NOTE — PLAN OF CARE
Problem: Adult Inpatient Plan of Care  Goal: Plan of Care Review  Outcome: Ongoing, Progressing     Problem: Infection  Goal: Absence of Infection Signs and Symptoms  Outcome: Ongoing, Progressing     Problem: Skin Injury Risk Increased  Goal: Skin Health and Integrity  Outcome: Ongoing, Progressing     Problem: Fall Injury Risk  Goal: Absence of Fall and Fall-Related Injury  Outcome: Ongoing, Progressing

## 2022-10-05 PROCEDURE — 63600175 PHARM REV CODE 636 W HCPCS: Performed by: STUDENT IN AN ORGANIZED HEALTH CARE EDUCATION/TRAINING PROGRAM

## 2022-10-05 PROCEDURE — 25000003 PHARM REV CODE 250: Performed by: HOSPITALIST

## 2022-10-05 PROCEDURE — 11000001 HC ACUTE MED/SURG PRIVATE ROOM

## 2022-10-05 PROCEDURE — 25000003 PHARM REV CODE 250: Performed by: INTERNAL MEDICINE

## 2022-10-05 RX ORDER — ENOXAPARIN SODIUM 100 MG/ML
40 INJECTION SUBCUTANEOUS EVERY 24 HOURS
Status: DISCONTINUED | OUTPATIENT
Start: 2022-10-05 | End: 2022-10-06

## 2022-10-05 RX ADMIN — THERA TABS 1 TABLET: TAB at 08:10

## 2022-10-05 RX ADMIN — POLYETHYLENE GLYCOL 3350 17 G: 17 POWDER, FOR SOLUTION ORAL at 08:10

## 2022-10-05 RX ADMIN — ENOXAPARIN SODIUM 40 MG: 100 INJECTION SUBCUTANEOUS at 05:10

## 2022-10-05 RX ADMIN — AMLODIPINE BESYLATE 10 MG: 5 TABLET ORAL at 08:10

## 2022-10-05 NOTE — PLAN OF CARE
JEREMIAH met with patient to discuss discharge planning. JEREMIAH reminded patient that Nestor, manager of group home and a nurse would be coming to the hospital to assess him. SW inquired if patient remembered. Patient nodded and said yes. SW reminded patient that they are going to ask him if he wants to go live in the group home. SW informed patient that he has a choice between going to nursing home or going to a group home where there will be people his age. Patient stated that he would like to go to a group home. SW informed patient that when Nestor asks him, he has to tell them. SW inquired if patient would like SW to be present when they come to assess him. Patient said yes.     2:36 pm     JEREMIAH contacted nurse Helen to inform that someone from group home will be coming by between 2:30-3:00 pm to assess patient for group home placement. Helen informed SW that 3 people came to assess patient at 2:15 pm. JEREMIAH informed Helen that someone from the group home was supposed to call JEREMIAH when they arrived.     JEREMIAH called Herlinda at [facility] to get name and contact information for the people from the group home that came to visit patient. There was no answer. JEREMIAH left a message.     2:45 pm     Herlinda returned phone call. JEREMIAH informed Herlinda that representatives from the group home came to visit but did not notify SW that they were here. JEREMIAH informed Herlinda that sh was under the impression that she was going to be present for the assessment. Herlinda stated that the group [facility] was not going to give SW any updates on case if she called. Herlinda stated that they probably didn't call because they probably just needed a visual to go with the medical records. Herlinda stated that she will call SW if they need more information.

## 2022-10-05 NOTE — ASSESSMENT & PLAN NOTE
Ca 10.7 on admit  Due severe dehydration.   Ca 8.9 on 09/23, and WNL on 10/04  Resolved with IVF

## 2022-10-05 NOTE — PLAN OF CARE
Recommendations     1) Continue Regular Diet - as tolerated, encourage PO, Fluid consumption  2) Continue Boost (+) TID to assist in meeting pt energy and protein needs  3) Monitor Nutrition related labs     Goals:   1) Pt to maintain adequate PO until discharge     Nutrition Goal Status: Goal met  Communication of RD Recs: (POC)  Nutritition is signing off, reconsult for further nutritional needs

## 2022-10-05 NOTE — ASSESSMENT & PLAN NOTE
Podiatry unable to trim toenails   Wound care trim toe nails.   Completed terbinafine x2 weeks for treatment on 10/05

## 2022-10-05 NOTE — ASSESSMENT & PLAN NOTE
-Na 151 on admit due to dehydration from lack of PO access.  -Na 141 on 10/04  -Resolved with IVF. Tolerating regular diet  -No need for further labs at this time

## 2022-10-05 NOTE — PROGRESS NOTES
"[facility] Medicine  Progress Note    Patient Name: Miguelito Landin  MRN: [id number]  Patient Class: IP- Inpatient   Admission Date: 2022  Length of Stay: 14 days  Attending Physician: Stephenie Pappas DO  Primary Care Provider: [facility]        Subjective:     Principal Problem:Dehydration with hypernatremia        HPI:  Mr. Landin is a 42yo man with a past medical history of cerebral palsy with spasticity and anxiety.  At baseline, he is normally cared for by his father at home.  He is a poor historian and cannot state to me what happened today.    Dr. Spring, the ED staff, was able to gather some collateral from the EMS on arrival.  Apparently his father and he had not been seen for quite some time, so a care check was initiated.  On arrival, it was found that the patient's father had , and the patient was confined to his bed due to his mobility issues (bed-bound).  The last known contact with the patient and his father was 22.  In the interim, the patient has had no water or food, and had to languish in his own urine and stool until help arrived today.  The patient would not speak to me about his father's death despite my bringing it up directly.  All he will say repeatedly is, "something happened."  EMS did notify the patient on arrival that his father was , at which point he did become extremely upset.    In the ED his VS's were BP (!) 152/94   Pulse (!) 158 -> 127 -> 114   Temp 98 °F (36.7 °C)   Resp 19   Ht 6' (1.829 m)   Wt 68 kg (150 lb)   SpO2 (!) 94% RA  BMI 20.34 kg/m².  Labs showed WBC 18, Hg 19, HCT 54, .  , HCO3 14, BUN 17, Cr 1.2, AG 24.  Gluc 115, Ca 10.7, TB 2.4, normal LFT's.  CPK 43, LA 1.7, BHB 4.6.  COVID NEG. VBG pH 7.39, PCO2 30.     No radiographs were done in the ED.  (CXR now ordered and pending though).  EKG showed sinus tachycardia to 148 with MARYAM and non-specific ST changes.     In the ED he was treated with a " banana bag bolus , and NS 2L bolus .      Overview/Hospital Course:  Mr Miguelito Landin who is bedbound from cerebral palsy who was admitted with hypernatremia, hypercalcemia due to dehydration from lack of access to food/water after his caretaker/father . Electrolyte disturbances improved. Tolerating diet. He is reasonably having stress and anxiety related to this; PRN xanax available. Social work consulted for locating his next of kin (both parents , other family is in [facility]) and plans for his care going forward.  Patient became placement issue       Interval History:  No acute overnight events.  Patient remained afebrile.   Answers yes and no to questions.  Denies any pain at this time.  Smiling, appears in good mood    Review of Systems   Constitutional:  Negative for chills and fever.   Eyes:  Negative for visual disturbance.   Respiratory:  Negative for cough and shortness of breath.    Cardiovascular:  Negative for chest pain.   Gastrointestinal:  Negative for abdominal pain, nausea and vomiting.   Neurological:  Negative for dizziness and headaches.     Objective:     Vital Signs (Most Recent):  Temp: 98.8 °F (37.1 °C) (10/05/22 1100)  Pulse: 105 (10/05/22 1100)  Resp: 17 (10/05/22 1100)  BP: 124/88 (10/05/22 1100)  SpO2: (!) 94 % (10/05/22 1100)   Vital Signs (24h Range):  Temp:  [97.1 °F (36.2 °C)-98.8 °F (37.1 °C)] 98.8 °F (37.1 °C)  Pulse:  [] 105  Resp:  [17-18] 17  SpO2:  [93 %-95 %] 94 %  BP: (110-138)/(66-92) 124/88     Weight: 64.5 kg (142 lb 3.2 oz)  Body mass index is 19.29 kg/m².    Intake/Output Summary (Last 24 hours) at 10/5/2022 1507  Last data filed at 10/5/2022 1225  Gross per 24 hour   Intake 720 ml   Output 850 ml   Net -130 ml        Physical Exam  Vitals and nursing note reviewed.   Constitutional:       General: He is not in acute distress.     Appearance: He is ill-appearing (chronically ill appearing). He is not toxic-appearing.      Comments:  Appears in good mood today   HENT:      Head: Atraumatic.      Mouth/Throat:      Mouth: Mucous membranes are moist.   Eyes:      Conjunctiva/sclera: Conjunctivae normal.   Cardiovascular:      Rate and Rhythm: Normal rate and regular rhythm.   Pulmonary:      Effort: Pulmonary effort is normal. No respiratory distress.      Breath sounds: No wheezing or rales.   Abdominal:      General: There is no distension.      Tenderness: There is no abdominal tenderness.   Skin:     General: Skin is warm and dry.      Comments: Toenails with onychomycosis. Bilateral feet with dry, crusting skin.    Neurological:      Mental Status: He is alert. Mental status is at baseline.      Comments: Hx of cerebral palsy. Able to answer yes and no to questions. Say he's doing okay   Psychiatric:         Mood and Affect: Mood normal.         Behavior: Behavior normal.       Significant Labs: All pertinent labs within the past 24 hours have been reviewed.    Significant Imaging: I have reviewed all pertinent imaging results/findings within the past 24 hours.      Assessment/Plan:      * Dehydration with hypernatremia  -Na 151 on admit due to dehydration from lack of PO access.  -Na 141 on 10/04  -Resolved with IVF. Tolerating regular diet  -No need for further labs at this time        Hypercalcemia  Ca 10.7 on admit  Due severe dehydration.   Ca 8.9 on , and WNL on 10/04  Resolved with IVF        Hypophosphatemia  Replace and monitor  Monitor for refeeding  Phos 3.0 on 10/04      Onychomycosis  Podiatry unable to trim toenails   Wound care trim toe nails.   Completed terbinafine x2 weeks for treatment on 10/05      Tinea pedis of both feet  Completed terbinafine x2 weeks for treatment on 10/05      Cerebral palsy  Consult social work for placement  Unsure if he has any family to care for him now that his father has - case management is working on this      SIRS (systemic inflammatory response syndrome)  This was most likely from  dehydration and has now resolved.   No signs of infection          VTE Risk Mitigation (From admission, onward)         Ordered     IP VTE LOW RISK PATIENT  Once         09/21/22 0433     Place sequential compression device  Until discontinued         09/21/22 0433     Place DAPHNE hose  Until discontinued         09/21/22 0433                Discharge Planning   RADHA:      Code Status: Full Code   Is the patient medically ready for discharge?:     Reason for patient still in hospital (select all that apply): Pending disposition  Discharge Plan A: Group Home   Discharge Delays: (!) Other (Attempting to locate next of kin.)              Stephenie Pappas DO  Department of Hospital Medicine   [facility] Surg

## 2022-10-05 NOTE — NURSING
Resumed care of patient, patient sitting up in bed watching television, external catheter in place, NADN.

## 2022-10-05 NOTE — SUBJECTIVE & OBJECTIVE
Interval History:  No acute overnight events.  Patient remained afebrile.   Answers yes and no to questions.  Denies any pain at this time.  Smiling, appears in good mood    Review of Systems   Constitutional:  Negative for chills and fever.   Eyes:  Negative for visual disturbance.   Respiratory:  Negative for cough and shortness of breath.    Cardiovascular:  Negative for chest pain.   Gastrointestinal:  Negative for abdominal pain, nausea and vomiting.   Neurological:  Negative for dizziness and headaches.     Objective:     Vital Signs (Most Recent):  Temp: 98.8 °F (37.1 °C) (10/05/22 1100)  Pulse: 105 (10/05/22 1100)  Resp: 17 (10/05/22 1100)  BP: 124/88 (10/05/22 1100)  SpO2: (!) 94 % (10/05/22 1100)   Vital Signs (24h Range):  Temp:  [97.1 °F (36.2 °C)-98.8 °F (37.1 °C)] 98.8 °F (37.1 °C)  Pulse:  [] 105  Resp:  [17-18] 17  SpO2:  [93 %-95 %] 94 %  BP: (110-138)/(66-92) 124/88     Weight: 64.5 kg (142 lb 3.2 oz)  Body mass index is 19.29 kg/m².    Intake/Output Summary (Last 24 hours) at 10/5/2022 1507  Last data filed at 10/5/2022 1225  Gross per 24 hour   Intake 720 ml   Output 850 ml   Net -130 ml        Physical Exam  Vitals and nursing note reviewed.   Constitutional:       General: He is not in acute distress.     Appearance: He is ill-appearing (chronically ill appearing). He is not toxic-appearing.      Comments: Appears in good mood today   HENT:      Head: Atraumatic.      Mouth/Throat:      Mouth: Mucous membranes are moist.   Eyes:      Conjunctiva/sclera: Conjunctivae normal.   Cardiovascular:      Rate and Rhythm: Normal rate and regular rhythm.   Pulmonary:      Effort: Pulmonary effort is normal. No respiratory distress.      Breath sounds: No wheezing or rales.   Abdominal:      General: There is no distension.      Tenderness: There is no abdominal tenderness.   Skin:     General: Skin is warm and dry.      Comments: Toenails with onychomycosis. Bilateral feet with dry, crusting skin.     Neurological:      Mental Status: He is alert. Mental status is at baseline.      Comments: Hx of cerebral palsy. Able to answer yes and no to questions. Say he's doing okay   Psychiatric:         Mood and Affect: Mood normal.         Behavior: Behavior normal.       Significant Labs: All pertinent labs within the past 24 hours have been reviewed.    Significant Imaging: I have reviewed all pertinent imaging results/findings within the past 24 hours.

## 2022-10-06 PROCEDURE — 11000001 HC ACUTE MED/SURG PRIVATE ROOM

## 2022-10-06 PROCEDURE — 25000003 PHARM REV CODE 250: Performed by: INTERNAL MEDICINE

## 2022-10-06 PROCEDURE — 25000003 PHARM REV CODE 250: Performed by: HOSPITALIST

## 2022-10-06 RX ADMIN — AMLODIPINE BESYLATE 10 MG: 5 TABLET ORAL at 09:10

## 2022-10-06 RX ADMIN — POLYETHYLENE GLYCOL 3350 17 G: 17 POWDER, FOR SOLUTION ORAL at 09:10

## 2022-10-06 RX ADMIN — THERA TABS 1 TABLET: TAB at 09:10

## 2022-10-06 NOTE — PROGRESS NOTES
"[facility] Medicine  Progress Note    Patient Name: Miguelito Landin  MRN: [id number]  Patient Class: IP- Inpatient   Admission Date: 2022  Length of Stay: 15 days  Attending Physician: Stephenie Pappas DO  [facility]        Subjective:     Principal Problem:Dehydration with hypernatremia        HPI:  Mr. Landin is a 42yo man with a past medical history of cerebral palsy with spasticity and anxiety.  At baseline, he is normally cared for by his father at home.  He is a poor historian and cannot state to me what happened today.    Dr. Spring, the ED staff, was able to gather some collateral from the EMS on arrival.  Apparently his father and he had not been seen for quite some time, so a care check was initiated.  On arrival, it was found that the patient's father had , and the patient was confined to his bed due to his mobility issues (bed-bound).  The last known contact with the patient and his father was 22.  In the interim, the patient has had no water or food, and had to languish in his own urine and stool until help arrived today.  The patient would not speak to me about his father's death despite my bringing it up directly.  All he will say repeatedly is, "something happened."  EMS did notify the patient on arrival that his father was , at which point he did become extremely upset.    In the ED his VS's were BP (!) 152/94   Pulse (!) 158 -> 127 -> 114   Temp 98 °F (36.7 °C)   Resp 19   Ht 6' (1.829 m)   Wt 68 kg (150 lb)   SpO2 (!) 94% RA  BMI 20.34 kg/m².  Labs showed WBC 18, Hg 19, HCT 54, .  , HCO3 14, BUN 17, Cr 1.2, AG 24.  Gluc 115, Ca 10.7, TB 2.4, normal LFT's.  CPK 43, LA 1.7, BHB 4.6.  COVID NEG. VBG pH 7.39, PCO2 30.     No radiographs were done in the ED.  (CXR now ordered and pending though).  EKG showed sinus tachycardia to 148 with MARYAM and non-specific ST changes.     In the ED he was treated with a " "banana bag bolus , and NS 2L bolus .      Overview/Hospital Course:  Mr Miguelito Landin who is bedbound from cerebral palsy who was admitted with hypernatremia, hypercalcemia due to dehydration from lack of access to food/water after his caretaker/father . Electrolyte disturbances improved. Tolerating diet. He is reasonably having stress and anxiety related to this; PRN xanax available. Social work consulted for locating his next of kin (both parents , other family is in [facility]) and plans for his care going forward.  Patient became placement issue. Of note, tried to initiate DVT prophylaxis but patient refuses because needles causes him a lot of stress and anxiety. Discussed risks of possible DVT given bedbound status. Patient responds "okay".       Interval History:  No acute overnight events.  Patient remained afebrile.   Nurse reported that it required three nurses to give him lovenox injection for DVT ppx. Needles cause distress and anxiety in patient. Discussed risks of DVT in bedbound patient. He responds okay. When asked if we can continue to give lovenox for DVT prophylaxis, he says no.   Denies any pain at this time.  Smiling, appears in good mood    Review of Systems   Constitutional:  Negative for chills and fever.   Eyes:  Negative for visual disturbance.   Respiratory:  Negative for cough and shortness of breath.    Cardiovascular:  Negative for chest pain.   Gastrointestinal:  Negative for abdominal pain, nausea and vomiting.   Neurological:  Negative for dizziness and headaches.     Objective:     Vital Signs (Most Recent):  Temp: 97.1 °F (36.2 °C) (10/06/22 1057)  Pulse: 98 (10/06/22 1057)  Resp: 17 (10/06/22 1057)  BP: 123/84 (10/06/22 1057)  SpO2: (!) 94 % (10/06/22 1057)   Vital Signs (24h Range):  Temp:  [97.1 °F (36.2 °C)-98.2 °F (36.8 °C)] 97.1 °F (36.2 °C)  Pulse:  [] 98  Resp:  [16-18] 17  SpO2:  [94 %-98 %] 94 %  BP: (110-146)/(68-92) 123/84     Weight: 64.5 " kg (142 lb 3.2 oz)  Body mass index is 19.29 kg/m².    Intake/Output Summary (Last 24 hours) at 10/6/2022 1134  Last data filed at 10/6/2022 0532  Gross per 24 hour   Intake 480 ml   Output 800 ml   Net -320 ml        Physical Exam  Vitals and nursing note reviewed.   Constitutional:       General: He is not in acute distress.     Appearance: He is ill-appearing (chronically ill appearing). He is not toxic-appearing.      Comments: Appears in good mood today   HENT:      Head: Atraumatic.      Mouth/Throat:      Mouth: Mucous membranes are moist.   Eyes:      Conjunctiva/sclera: Conjunctivae normal.   Cardiovascular:      Rate and Rhythm: Normal rate and regular rhythm.   Pulmonary:      Effort: Pulmonary effort is normal. No respiratory distress.      Breath sounds: No wheezing or rales.   Abdominal:      General: There is no distension.      Tenderness: There is no abdominal tenderness.   Skin:     General: Skin is warm and dry.      Comments: Toenails with onychomycosis. Bilateral feet with dry, crusting skin.    Neurological:      Mental Status: He is alert. Mental status is at baseline.      Comments: Hx of cerebral palsy. Able to answer yes and no to questions. Say he's doing okay   Psychiatric:         Mood and Affect: Mood normal.         Behavior: Behavior normal.       Significant Labs: All pertinent labs within the past 24 hours have been reviewed.    Significant Imaging: I have reviewed all pertinent imaging results/findings within the past 24 hours.      Assessment/Plan:      * Dehydration with hypernatremia  -Na 151 on admit due to dehydration from lack of PO access.  -Na 141 on 10/04  -Resolved with IVF. Tolerating regular diet  -No need for further labs at this time        Hypercalcemia  Ca 10.7 on admit  Due severe dehydration.   Ca 8.9 on 09/23, and WNL on 10/04  Resolved with IVF        Hypophosphatemia  Replace and monitor  Monitor for refeeding  Phos 3.0 on 10/04      Onychomycosis  Podiatry  unable to trim toenails   Wound care trim toe nails.   Completed terbinafine x2 weeks for treatment on 10/05      Tinea pedis of both feet  Completed terbinafine x2 weeks for treatment on 10/05      Cerebral palsy  Consult social work for placement  Unsure if he has any family to care for him now that his father has - case management is working on this      SIRS (systemic inflammatory response syndrome)  This was most likely from dehydration and has now resolved.   No signs of infection          VTE Risk Mitigation (From admission, onward)         Ordered     IP VTE LOW RISK PATIENT  Once         22     Place sequential compression device  Until discontinued         22     Place DAPHNE hose  Until discontinued         22                Discharge Planning   RADHA:      Code Status: Full Code   Is the patient medically ready for discharge?:     Reason for patient still in hospital (select all that apply): Pending disposition  Discharge Plan A: Group Home   Discharge Delays: (!) Other (Attempting to locate next of kin.)              Stephenie Pappas DO  Department of Hospital Medicine   [facility] - Med Surg

## 2022-10-06 NOTE — SUBJECTIVE & OBJECTIVE
Interval History:  No acute overnight events.  Patient remained afebrile.   Nurse reported that it required three nurses to give him lovenox injection for DVT ppx. Needles cause distress and anxiety in patient. Discussed risks of DVT in bedbound patient. He responds okay. When asked if we can continue to give lovenox for DVT prophylaxis, he says no.   Denies any pain at this time.  Smiling, appears in good mood    Review of Systems   Constitutional:  Negative for chills and fever.   Eyes:  Negative for visual disturbance.   Respiratory:  Negative for cough and shortness of breath.    Cardiovascular:  Negative for chest pain.   Gastrointestinal:  Negative for abdominal pain, nausea and vomiting.   Neurological:  Negative for dizziness and headaches.     Objective:     Vital Signs (Most Recent):  Temp: 97.1 °F (36.2 °C) (10/06/22 1057)  Pulse: 98 (10/06/22 1057)  Resp: 17 (10/06/22 1057)  BP: 123/84 (10/06/22 1057)  SpO2: (!) 94 % (10/06/22 1057)   Vital Signs (24h Range):  Temp:  [97.1 °F (36.2 °C)-98.2 °F (36.8 °C)] 97.1 °F (36.2 °C)  Pulse:  [] 98  Resp:  [16-18] 17  SpO2:  [94 %-98 %] 94 %  BP: (110-146)/(68-92) 123/84     Weight: 64.5 kg (142 lb 3.2 oz)  Body mass index is 19.29 kg/m².    Intake/Output Summary (Last 24 hours) at 10/6/2022 1134  Last data filed at 10/6/2022 0532  Gross per 24 hour   Intake 480 ml   Output 800 ml   Net -320 ml        Physical Exam  Vitals and nursing note reviewed.   Constitutional:       General: He is not in acute distress.     Appearance: He is ill-appearing (chronically ill appearing). He is not toxic-appearing.      Comments: Appears in good mood today   HENT:      Head: Atraumatic.      Mouth/Throat:      Mouth: Mucous membranes are moist.   Eyes:      Conjunctiva/sclera: Conjunctivae normal.   Cardiovascular:      Rate and Rhythm: Normal rate and regular rhythm.   Pulmonary:      Effort: Pulmonary effort is normal. No respiratory distress.      Breath sounds: No  wheezing or rales.   Abdominal:      General: There is no distension.      Tenderness: There is no abdominal tenderness.   Skin:     General: Skin is warm and dry.      Comments: Toenails with onychomycosis. Bilateral feet with dry, crusting skin.    Neurological:      Mental Status: He is alert. Mental status is at baseline.      Comments: Hx of cerebral palsy. Able to answer yes and no to questions. Say he's doing okay   Psychiatric:         Mood and Affect: Mood normal.         Behavior: Behavior normal.       Significant Labs: All pertinent labs within the past 24 hours have been reviewed.    Significant Imaging: I have reviewed all pertinent imaging results/findings within the past 24 hours.

## 2022-10-06 NOTE — PLAN OF CARE
"JEREMIAH met with Ruthann (JEREMIAH) and Lili (SYLVIE) with [facility]. Ruthann stated that they came out yesterday to assess patient and brought an additional nurse to assess as well. Ruthann stated that as of now, patient does not meet criteria because he does not have a intermediate care diagnoses for mental disability. Ruthann stated that a Psychologist will have to evaluate patient to make the diagnoses. JEREMIAH inquired if [facility] will get a Psychologist to assess patient and make a diagnoses. Ruthann stated that they may in order to get patient placed. Ruthann stated that [facility] will be in touch.     JEREMIAH met with patient to update him on placement. JEREMIAH explained to patient that as of now, he has been denied by [facility] but explained that a Psychologist may come and talk to him to see if he has mental disorder. Patient said, "Oh no, I'm nervous." SW informed patient that there was nothing to be nervous about and encouraged him to take some deep breaths to calm down. As patient did some deep breathing, JEREMIAH explained that all they were going to do is ask him some questions. Patient said, "ok."  "

## 2022-10-07 PROCEDURE — 25000003 PHARM REV CODE 250: Performed by: INTERNAL MEDICINE

## 2022-10-07 PROCEDURE — 25000003 PHARM REV CODE 250: Performed by: HOSPITALIST

## 2022-10-07 PROCEDURE — 11000001 HC ACUTE MED/SURG PRIVATE ROOM

## 2022-10-07 RX ADMIN — POLYETHYLENE GLYCOL 3350 17 G: 17 POWDER, FOR SOLUTION ORAL at 08:10

## 2022-10-07 RX ADMIN — THERA TABS 1 TABLET: TAB at 08:10

## 2022-10-07 RX ADMIN — AMLODIPINE BESYLATE 10 MG: 5 TABLET ORAL at 08:10

## 2022-10-07 NOTE — PLAN OF CARE
Problem: Adult Inpatient Plan of Care  Goal: Plan of Care Review  Outcome: Ongoing, Progressing  Goal: Optimal Comfort and Wellbeing  Outcome: Ongoing, Progressing  Goal: Readiness for Transition of Care  Outcome: Ongoing, Progressing     Problem: Skin Injury Risk Increased  Goal: Skin Health and Integrity  Outcome: Ongoing, Progressing     Problem: Fall Injury Risk  Goal: Absence of Fall and Fall-Related Injury  Outcome: Ongoing, Progressing

## 2022-10-07 NOTE — PROGRESS NOTES
"[facility] Medicine  Progress Note    Patient Name: Miguelito Landin  MRN: [id number]  Patient Class: IP- Inpatient   Admission Date: 2022  Length of Stay: 16 days  Attending Physician: Stephenie Pappas DO  Primary Care Provider: Ventura Allan        Subjective:     Principal Problem:Dehydration with hypernatremia        HPI:  Mr. Landin is a 42yo man with a past medical history of cerebral palsy with spasticity and anxiety.  At baseline, he is normally cared for by his father at home.  He is a poor historian and cannot state to me what happened today.    Dr. Spring, the ED staff, was able to gather some collateral from the EMS on arrival.  Apparently his father and he had not been seen for quite some time, so a care check was initiated.  On arrival, it was found that the patient's father had , and the patient was confined to his bed due to his mobility issues (bed-bound).  The last known contact with the patient and his father was 22.  In the interim, the patient has had no water or food, and had to languish in his own urine and stool until help arrived today.  The patient would not speak to me about his father's death despite my bringing it up directly.  All he will say repeatedly is, "something happened."  EMS did notify the patient on arrival that his father was , at which point he did become extremely upset.    In the ED his VS's were BP (!) 152/94   Pulse (!) 158 -> 127 -> 114   Temp 98 °F (36.7 °C)   Resp 19   Ht 6' (1.829 m)   Wt 68 kg (150 lb)   SpO2 (!) 94% RA  BMI 20.34 kg/m².  Labs showed WBC 18, Hg 19, HCT 54, .  , HCO3 14, BUN 17, Cr 1.2, AG 24.  Gluc 115, Ca 10.7, TB 2.4, normal LFT's.  CPK 43, LA 1.7, BHB 4.6.  COVID NEG. VBG pH 7.39, PCO2 30.     No radiographs were done in the ED.  (CXR now ordered and pending though).  EKG showed sinus tachycardia to 148 with MARYAM and non-specific ST changes.     In the ED he was treated with a " "banana bag bolus , and NS 2L bolus .      Overview/Hospital Course:  Mr Miguelito Landin who is bedbound from cerebral palsy who was admitted with hypernatremia, hypercalcemia due to dehydration from lack of access to food/water after his caretaker/father . Electrolyte disturbances improved. Tolerating diet. He is reasonably having stress and anxiety related to this; PRN xanax available. Social work consulted for locating his next of kin (both parents , other family is in [facility]) and plans for his care going forward.  Patient became placement issue. Of note, tried to initiate DVT prophylaxis but patient refuses because needles causes him a lot of stress and anxiety. Discussed risks of possible DVT given bedbound status. Patient responds "okay".       Interval History:  No acute overnight events.  Patient remained afebrile.    Denies any pain at this time.  Smiling, says he's hungry     Review of Systems   Constitutional:  Negative for chills and fever.   Eyes:  Negative for visual disturbance.   Respiratory:  Negative for cough and shortness of breath.    Cardiovascular:  Negative for chest pain.   Gastrointestinal:  Negative for abdominal pain, nausea and vomiting.   Neurological:  Negative for dizziness and headaches.     Objective:     Vital Signs (Most Recent):  Temp: 98.4 °F (36.9 °C) (10/07/22 1102)  Pulse: 86 (10/07/22 1102)  Resp: 17 (10/07/22 1102)  BP: 133/82 (10/07/22 1102)  SpO2: (!) 94 % (10/07/22 110)   Vital Signs (24h Range):  Temp:  [97 °F (36.1 °C)-98.4 °F (36.9 °C)] 98.4 °F (36.9 °C)  Pulse:  [67-97] 86  Resp:  [17-18] 17  SpO2:  [93 %-97 %] 94 %  BP: (111-165)/(65-85) 133/82     Weight: 64.5 kg (142 lb 3.2 oz)  Body mass index is 19.29 kg/m².    Intake/Output Summary (Last 24 hours) at 10/7/2022 1143  Last data filed at 10/7/2022 0826  Gross per 24 hour   Intake 1480 ml   Output 1000 ml   Net 480 ml        Physical Exam  Vitals and nursing note reviewed. "   Constitutional:       General: He is not in acute distress.     Appearance: He is ill-appearing (chronically ill appearing). He is not toxic-appearing.      Comments: Appears in good mood today   HENT:      Head: Atraumatic.      Mouth/Throat:      Mouth: Mucous membranes are moist.   Eyes:      Conjunctiva/sclera: Conjunctivae normal.   Cardiovascular:      Rate and Rhythm: Normal rate and regular rhythm.   Pulmonary:      Effort: Pulmonary effort is normal. No respiratory distress.      Breath sounds: No wheezing or rales.   Abdominal:      General: There is no distension.      Tenderness: There is no abdominal tenderness.   Skin:     General: Skin is warm and dry.      Comments: Toenails with onychomycosis. Bilateral feet with dry, crusting skin.    Neurological:      Mental Status: He is alert. Mental status is at baseline.      Comments: Hx of cerebral palsy. Able to answer yes and no to questions. Say he's doing okay   Psychiatric:         Mood and Affect: Mood normal.         Behavior: Behavior normal.       Significant Labs: All pertinent labs within the past 24 hours have been reviewed.    Significant Imaging: I have reviewed all pertinent imaging results/findings within the past 24 hours.      Assessment/Plan:      * Dehydration with hypernatremia  -Na 151 on admit due to dehydration from lack of PO access.  -Na 141 on 10/04  -Resolved with IVF. Tolerating regular diet  -No need for further labs at this time        Hypercalcemia  Ca 10.7 on admit  Due severe dehydration.   Ca 8.9 on 09/23, and WNL on 10/04  Resolved with IVF        Hypophosphatemia  Replace and monitor  Monitor for refeeding  Phos 3.0 on 10/04      Onychomycosis  Podiatry unable to trim toenails   Wound care trim toe nails.   Completed terbinafine x2 weeks for treatment on 10/05      Tinea pedis of both feet  Completed terbinafine x2 weeks for treatment on 10/05      Cerebral palsy  Consult social work for placement  Unsure if he has any  family to care for him now that his father has - case management is working on this      SIRS (systemic inflammatory response syndrome)  This was most likely from dehydration and has now resolved.   No signs of infection          VTE Risk Mitigation (From admission, onward)         Ordered     IP VTE LOW RISK PATIENT  Once         22     Place sequential compression device  Until discontinued         22     Place DAPHNE hose  Until discontinued         22                Discharge Planning   RADHA:      Code Status: Full Code   Is the patient medically ready for discharge?:     Reason for patient still in hospital (select all that apply): Pending disposition  Discharge Plan A: Group Home   Discharge Delays: (!) Other (Attempting to locate next of kin.)              Stephenie Pappas DO  Department of Hospital Medicine   [facility] - Med Surg

## 2022-10-07 NOTE — PLAN OF CARE
Problem: Adult Inpatient Plan of Care  Goal: Plan of Care Review  Outcome: Ongoing, Progressing  Goal: Patient-Specific Goal (Individualized)  Outcome: Ongoing, Progressing  Goal: Absence of Hospital-Acquired Illness or Injury  Outcome: Ongoing, Progressing  Goal: Optimal Comfort and Wellbeing  Outcome: Ongoing, Progressing  Goal: Readiness for Transition of Care  Outcome: Ongoing, Progressing     Problem: Infection  Goal: Absence of Infection Signs and Symptoms  Outcome: Ongoing, Progressing     Problem: Skin Injury Risk Increased  Goal: Skin Health and Integrity  Outcome: Ongoing, Progressing     Problem: Fall Injury Risk  Goal: Absence of Fall and Fall-Related Injury  Outcome: Ongoing, Progressing

## 2022-10-07 NOTE — PLAN OF CARE
12:10 pm     Maggie with [facility] called SW to inquire if they could come and see patient Wednesday.    2:14 pm     Maggie from [facility] call to inform SW that Mac would be coming today instead of Wednesday because he was in the area.

## 2022-10-07 NOTE — SUBJECTIVE & OBJECTIVE
Interval History:  No acute overnight events.  Patient remained afebrile.    Denies any pain at this time.  Smiling, says he's hungry     Review of Systems   Constitutional:  Negative for chills and fever.   Eyes:  Negative for visual disturbance.   Respiratory:  Negative for cough and shortness of breath.    Cardiovascular:  Negative for chest pain.   Gastrointestinal:  Negative for abdominal pain, nausea and vomiting.   Neurological:  Negative for dizziness and headaches.     Objective:     Vital Signs (Most Recent):  Temp: 98.4 °F (36.9 °C) (10/07/22 1102)  Pulse: 86 (10/07/22 1102)  Resp: 17 (10/07/22 1102)  BP: 133/82 (10/07/22 1102)  SpO2: (!) 94 % (10/07/22 1102)   Vital Signs (24h Range):  Temp:  [97 °F (36.1 °C)-98.4 °F (36.9 °C)] 98.4 °F (36.9 °C)  Pulse:  [67-97] 86  Resp:  [17-18] 17  SpO2:  [93 %-97 %] 94 %  BP: (111-165)/(65-85) 133/82     Weight: 64.5 kg (142 lb 3.2 oz)  Body mass index is 19.29 kg/m².    Intake/Output Summary (Last 24 hours) at 10/7/2022 1143  Last data filed at 10/7/2022 0826  Gross per 24 hour   Intake 1480 ml   Output 1000 ml   Net 480 ml        Physical Exam  Vitals and nursing note reviewed.   Constitutional:       General: He is not in acute distress.     Appearance: He is ill-appearing (chronically ill appearing). He is not toxic-appearing.      Comments: Appears in good mood today   HENT:      Head: Atraumatic.      Mouth/Throat:      Mouth: Mucous membranes are moist.   Eyes:      Conjunctiva/sclera: Conjunctivae normal.   Cardiovascular:      Rate and Rhythm: Normal rate and regular rhythm.   Pulmonary:      Effort: Pulmonary effort is normal. No respiratory distress.      Breath sounds: No wheezing or rales.   Abdominal:      General: There is no distension.      Tenderness: There is no abdominal tenderness.   Skin:     General: Skin is warm and dry.      Comments: Toenails with onychomycosis. Bilateral feet with dry, crusting skin.    Neurological:      Mental Status: He  is alert. Mental status is at baseline.      Comments: Hx of cerebral palsy. Able to answer yes and no to questions. Say he's doing okay   Psychiatric:         Mood and Affect: Mood normal.         Behavior: Behavior normal.       Significant Labs: All pertinent labs within the past 24 hours have been reviewed.    Significant Imaging: I have reviewed all pertinent imaging results/findings within the past 24 hours.

## 2022-10-08 PROCEDURE — 25000003 PHARM REV CODE 250: Performed by: HOSPITALIST

## 2022-10-08 PROCEDURE — 25000003 PHARM REV CODE 250: Performed by: INTERNAL MEDICINE

## 2022-10-08 PROCEDURE — 11000001 HC ACUTE MED/SURG PRIVATE ROOM

## 2022-10-08 RX ADMIN — THERA TABS 1 TABLET: TAB at 09:10

## 2022-10-08 RX ADMIN — POLYETHYLENE GLYCOL 3350 17 G: 17 POWDER, FOR SOLUTION ORAL at 09:10

## 2022-10-08 NOTE — SUBJECTIVE & OBJECTIVE
Interval History:  No acute overnight events.  Patient remained afebrile.    Denies any pain at this time.  Preparing to eat his breakfast this AM    Review of Systems   Constitutional:  Negative for chills and fever.   Eyes:  Negative for visual disturbance.   Respiratory:  Negative for cough and shortness of breath.    Cardiovascular:  Negative for chest pain.   Gastrointestinal:  Negative for abdominal pain, nausea and vomiting.   Neurological:  Negative for dizziness and headaches.     Objective:     Vital Signs (Most Recent):  Temp: 98.3 °F (36.8 °C) (10/08/22 1137)  Pulse: 93 (10/08/22 1137)  Resp: 18 (10/08/22 1137)  BP: 105/65 (10/08/22 1137)  SpO2: (!) 93 % (10/08/22 1137)   Vital Signs (24h Range):  Temp:  [97 °F (36.1 °C)-98.3 °F (36.8 °C)] 98.3 °F (36.8 °C)  Pulse:  [63-96] 93  Resp:  [17-18] 18  SpO2:  [93 %-96 %] 93 %  BP: (102-140)/(65-94) 105/65     Weight: 64.5 kg (142 lb 3.2 oz)  Body mass index is 19.29 kg/m².    Intake/Output Summary (Last 24 hours) at 10/8/2022 1202  Last data filed at 10/7/2022 1713  Gross per 24 hour   Intake 480 ml   Output 900 ml   Net -420 ml        Physical Exam  Vitals and nursing note reviewed.   Constitutional:       General: He is not in acute distress.     Appearance: He is ill-appearing (chronically ill appearing). He is not toxic-appearing.      Comments: Appears in good mood today   HENT:      Head: Atraumatic.      Mouth/Throat:      Mouth: Mucous membranes are moist.   Eyes:      Conjunctiva/sclera: Conjunctivae normal.   Cardiovascular:      Rate and Rhythm: Normal rate and regular rhythm.   Pulmonary:      Effort: Pulmonary effort is normal. No respiratory distress.      Breath sounds: No wheezing or rales.   Abdominal:      General: There is no distension.      Tenderness: There is no abdominal tenderness.   Skin:     General: Skin is warm and dry.      Comments: Toenails with onychomycosis. Bilateral feet with dry, crusting skin.    Neurological:      Mental  Status: He is alert. Mental status is at baseline.      Comments: Hx of cerebral palsy. Able to answer yes and no to questions. Say he's doing okay   Psychiatric:         Mood and Affect: Mood normal.         Behavior: Behavior normal.       Significant Labs: All pertinent labs within the past 24 hours have been reviewed.    Significant Imaging: I have reviewed all pertinent imaging results/findings within the past 24 hours.

## 2022-10-08 NOTE — PROGRESS NOTES
"[facility] Medicine  Progress Note    Patient Name: Miguelito Landin  MRN: [id number]  Patient Class: IP- Inpatient   Admission Date: 2022  Length of Stay: 17 days  Attending Physician: Stephenie Pappas DO  Primary Care Provider: Ventura Allan        Subjective:     Principal Problem:Dehydration with hypernatremia        HPI:  Mr. Landin is a 40yo man with a past medical history of cerebral palsy with spasticity and anxiety.  At baseline, he is normally cared for by his father at home.  He is a poor historian and cannot state to me what happened today.    Dr. Spring, the ED staff, was able to gather some collateral from the EMS on arrival.  Apparently his father and he had not been seen for quite some time, so a care check was initiated.  On arrival, it was found that the patient's father had , and the patient was confined to his bed due to his mobility issues (bed-bound).  The last known contact with the patient and his father was 22.  In the interim, the patient has had no water or food, and had to languish in his own urine and stool until help arrived today.  The patient would not speak to me about his father's death despite my bringing it up directly.  All he will say repeatedly is, "something happened."  EMS did notify the patient on arrival that his father was , at which point he did become extremely upset.    In the ED his VS's were BP (!) 152/94   Pulse (!) 158 -> 127 -> 114   Temp 98 °F (36.7 °C)   Resp 19   Ht 6' (1.829 m)   Wt 68 kg (150 lb)   SpO2 (!) 94% RA  BMI 20.34 kg/m².  Labs showed WBC 18, Hg 19, HCT 54, .  , HCO3 14, BUN 17, Cr 1.2, AG 24.  Gluc 115, Ca 10.7, TB 2.4, normal LFT's.  CPK 43, LA 1.7, BHB 4.6.  COVID NEG. VBG pH 7.39, PCO2 30.     No radiographs were done in the ED.  (CXR now ordered and pending though).  EKG showed sinus tachycardia to 148 with MARYAM and non-specific ST changes.     In the ED he was treated with a " "banana bag bolus , and NS 2L bolus .      Overview/Hospital Course:  Mr Miguelito Landin who is bedbound from cerebral palsy who was admitted with hypernatremia, hypercalcemia due to dehydration from lack of access to food/water after his caretaker/father . Electrolyte disturbances improved. Tolerating diet. He is reasonably having stress and anxiety related to this; PRN xanax available. Social work consulted for locating his next of kin (both parents , other family is in [facility]) and plans for his care going forward.  Patient became placement issue. Of note, tried to initiate DVT prophylaxis but patient refuses because needles causes him a lot of stress and anxiety. Discussed risks of possible DVT given bedbound status. Patient responds "okay". Awaiting placement. CM/SW assisting with placement and discharge disposition.       Interval History:  No acute overnight events.  Patient remained afebrile.    Denies any pain at this time.  Preparing to eat his breakfast this AM    Review of Systems   Constitutional:  Negative for chills and fever.   Eyes:  Negative for visual disturbance.   Respiratory:  Negative for cough and shortness of breath.    Cardiovascular:  Negative for chest pain.   Gastrointestinal:  Negative for abdominal pain, nausea and vomiting.   Neurological:  Negative for dizziness and headaches.     Objective:     Vital Signs (Most Recent):  Temp: 98.3 °F (36.8 °C) (10/08/22 1137)  Pulse: 93 (10/08/22 113)  Resp: 18 (10/08/22 1137)  BP: 105/65 (10/08/22 1137)  SpO2: (!) 93 % (10/08/22 1137)   Vital Signs (24h Range):  Temp:  [97 °F (36.1 °C)-98.3 °F (36.8 °C)] 98.3 °F (36.8 °C)  Pulse:  [63-96] 93  Resp:  [17-18] 18  SpO2:  [93 %-96 %] 93 %  BP: (102-140)/(65-94) 105/65     Weight: 64.5 kg (142 lb 3.2 oz)  Body mass index is 19.29 kg/m².    Intake/Output Summary (Last 24 hours) at 10/8/2022 1202  Last data filed at 10/7/2022 1713  Gross per 24 hour   Intake 480 ml   Output 900 " ml   Net -420 ml        Physical Exam  Vitals and nursing note reviewed.   Constitutional:       General: He is not in acute distress.     Appearance: He is ill-appearing (chronically ill appearing). He is not toxic-appearing.      Comments: Appears in good mood today   HENT:      Head: Atraumatic.      Mouth/Throat:      Mouth: Mucous membranes are moist.   Eyes:      Conjunctiva/sclera: Conjunctivae normal.   Cardiovascular:      Rate and Rhythm: Normal rate and regular rhythm.   Pulmonary:      Effort: Pulmonary effort is normal. No respiratory distress.      Breath sounds: No wheezing or rales.   Abdominal:      General: There is no distension.      Tenderness: There is no abdominal tenderness.   Skin:     General: Skin is warm and dry.      Comments: Toenails with onychomycosis. Bilateral feet with dry, crusting skin.    Neurological:      Mental Status: He is alert. Mental status is at baseline.      Comments: Hx of cerebral palsy. Able to answer yes and no to questions. Say he's doing okay   Psychiatric:         Mood and Affect: Mood normal.         Behavior: Behavior normal.       Significant Labs: All pertinent labs within the past 24 hours have been reviewed.    Significant Imaging: I have reviewed all pertinent imaging results/findings within the past 24 hours.      Assessment/Plan:      * Dehydration with hypernatremia  -Na 151 on admit due to dehydration from lack of PO access.  -Na 141 on 10/04  -Resolved with IVF. Tolerating regular diet  -No need for further labs at this time        Hypercalcemia  Ca 10.7 on admit  Due severe dehydration.   Ca 8.9 on 09/23, and WNL on 10/04  Resolved with IVF        Hypophosphatemia  Replace and monitor  Monitor for refeeding  Phos 3.0 on 10/04      Onychomycosis  Podiatry unable to trim toenails   Wound care trim toe nails.   Completed terbinafine x2 weeks for treatment on 10/05      Tinea pedis of both feet  Completed terbinafine x2 weeks for treatment on  10/05      Cerebral palsy  Consult social work for placement  Unsure if he has any family to care for him now that his father has - case management is working on this      SIRS (systemic inflammatory response syndrome)  This was most likely from dehydration and has now resolved.   No signs of infection          VTE Risk Mitigation (From admission, onward)         Ordered     IP VTE LOW RISK PATIENT  Once         22     Place sequential compression device  Until discontinued         22     Place DAPHNE hose  Until discontinued         22                Discharge Planning   RADHA:      Code Status: Full Code   Is the patient medically ready for discharge?:     Reason for patient still in hospital (select all that apply): Pending disposition  Discharge Plan A: Group Home   Discharge Delays: (!) Other (Attempting to locate next of kin.)              Stephenie Pappas DO  Department of Hospital Medicine   [facility]

## 2022-10-08 NOTE — PLAN OF CARE
Problem: Adult Inpatient Plan of Care  Goal: Plan of Care Review  Outcome: Ongoing, Progressing  Goal: Patient-Specific Goal (Individualized)  Outcome: Ongoing, Progressing  Goal: Absence of Hospital-Acquired Illness or Injury  Outcome: Ongoing, Progressing  Goal: Optimal Comfort and Wellbeing  Outcome: Ongoing, Progressing  Goal: Readiness for Transition of Care  Outcome: Ongoing, Progressing     Problem: Infection  Goal: Absence of Infection Signs and Symptoms  Outcome: Ongoing, Progressing     Problem: Skin Injury Risk Increased  Goal: Skin Health and Integrity  Outcome: Ongoing, Progressing     Problem: Fall Injury Risk  Goal: Absence of Fall and Fall-Related Injury  Outcome: Ongoing, Progressing     No acute events throughout shift. Pt resting comfortably in bed. All needs met at this time. Will continue with POC.

## 2022-10-09 PROCEDURE — 25000003 PHARM REV CODE 250: Performed by: HOSPITALIST

## 2022-10-09 PROCEDURE — 25000003 PHARM REV CODE 250: Performed by: INTERNAL MEDICINE

## 2022-10-09 PROCEDURE — 11000001 HC ACUTE MED/SURG PRIVATE ROOM

## 2022-10-09 RX ADMIN — AMLODIPINE BESYLATE 10 MG: 5 TABLET ORAL at 08:10

## 2022-10-09 RX ADMIN — POLYETHYLENE GLYCOL 3350 17 G: 17 POWDER, FOR SOLUTION ORAL at 08:10

## 2022-10-09 RX ADMIN — THERA TABS 1 TABLET: TAB at 08:10

## 2022-10-09 NOTE — PROGRESS NOTES
"[facility] Medicine  Progress Note    Patient Name: Miguelito Landin  MRN: [id number]  Patient Class: IP- Inpatient   Admission Date: 2022  Length of Stay: 18 days  Attending Physician: Stephenie Pappas DO  Primary Care Provider: Ventura Allan        Subjective:     Principal Problem:Dehydration with hypernatremia        HPI:  Mr. Landin is a 42yo man with a past medical history of cerebral palsy with spasticity and anxiety.  At baseline, he is normally cared for by his father at home.  He is a poor historian and cannot state to me what happened today.    Dr. Spring, the ED staff, was able to gather some collateral from the EMS on arrival.  Apparently his father and he had not been seen for quite some time, so a care check was initiated.  On arrival, it was found that the patient's father had , and the patient was confined to his bed due to his mobility issues (bed-bound).  The last known contact with the patient and his father was 22.  In the interim, the patient has had no water or food, and had to languish in his own urine and stool until help arrived today.  The patient would not speak to me about his father's death despite my bringing it up directly.  All he will say repeatedly is, "something happened."  EMS did notify the patient on arrival that his father was , at which point he did become extremely upset.    In the ED his VS's were BP (!) 152/94   Pulse (!) 158 -> 127 -> 114   Temp 98 °F (36.7 °C)   Resp 19   Ht 6' (1.829 m)   Wt 68 kg (150 lb)   SpO2 (!) 94% RA  BMI 20.34 kg/m².  Labs showed WBC 18, Hg 19, HCT 54, .  , HCO3 14, BUN 17, Cr 1.2, AG 24.  Gluc 115, Ca 10.7, TB 2.4, normal LFT's.  CPK 43, LA 1.7, BHB 4.6.  COVID NEG. VBG pH 7.39, PCO2 30.     No radiographs were done in the ED.  (CXR now ordered and pending though).  EKG showed sinus tachycardia to 148 with MARYAM and non-specific ST changes.     In the ED he was treated with a " "banana bag bolus , and NS 2L bolus .      Overview/Hospital Course:  Mr Miguelito Landin who is bedbound from cerebral palsy who was admitted with hypernatremia, hypercalcemia due to dehydration from lack of access to food/water after his caretaker/father . Electrolyte disturbances improved. Tolerating diet. He is reasonably having stress and anxiety related to this; PRN xanax available. Social work consulted for locating his next of kin (both parents , other family is in [facility]) and plans for his care going forward.  Patient became placement issue. Of note, tried to initiate DVT prophylaxis but patient refuses because needles causes him a lot of stress and anxiety. Discussed risks of possible DVT given bedbound status. Patient responds "okay". Awaiting placement. CM/SW assisting with placement and discharge disposition.       Interval History:  No acute overnight events.  Patient remained afebrile.    Denies any pain at this time.  Eating breakfast this morning without issues    Review of Systems   Constitutional:  Negative for chills and fever.   Eyes:  Negative for visual disturbance.   Respiratory:  Negative for cough and shortness of breath.    Cardiovascular:  Negative for chest pain.   Gastrointestinal:  Negative for abdominal pain, nausea and vomiting.   Neurological:  Negative for dizziness and headaches.     Objective:     Vital Signs (Most Recent):  Temp: 97.4 °F (36.3 °C) (10/09/22 0809)  Pulse: 72 (10/09/22 0809)  Resp: 14 (10/09/22 0809)  BP: 111/68 (10/09/22 0809)  SpO2: 98 % (10/09/22 0809)   Vital Signs (24h Range):  Temp:  [97.4 °F (36.3 °C)-98.3 °F (36.8 °C)] 97.4 °F (36.3 °C)  Pulse:  [52-95] 72  Resp:  [14-18] 14  SpO2:  [93 %-98 %] 98 %  BP: (101-121)/(62-71) 111/68     Weight: 64.5 kg (142 lb 3.2 oz)  Body mass index is 19.29 kg/m².    Intake/Output Summary (Last 24 hours) at 10/9/2022 1132  Last data filed at 10/8/2022 1700  Gross per 24 hour   Intake 480 ml   Output " 600 ml   Net -120 ml        Physical Exam  Vitals and nursing note reviewed.   Constitutional:       General: He is not in acute distress.     Appearance: He is ill-appearing (chronically ill appearing). He is not toxic-appearing.      Comments: Appears in good mood today   HENT:      Head: Atraumatic.      Mouth/Throat:      Mouth: Mucous membranes are moist.   Eyes:      Conjunctiva/sclera: Conjunctivae normal.   Cardiovascular:      Rate and Rhythm: Normal rate and regular rhythm.   Pulmonary:      Effort: Pulmonary effort is normal. No respiratory distress.      Breath sounds: No wheezing or rales.   Abdominal:      General: There is no distension.      Tenderness: There is no abdominal tenderness.   Skin:     General: Skin is warm and dry.      Comments: Toenails with onychomycosis. Bilateral feet with dry, crusting skin.    Neurological:      Mental Status: He is alert. Mental status is at baseline.      Comments: Hx of cerebral palsy. Able to answer yes and no to questions. Say he's doing okay   Psychiatric:         Mood and Affect: Mood normal.         Behavior: Behavior normal.       Significant Labs: All pertinent labs within the past 24 hours have been reviewed.    Significant Imaging: I have reviewed all pertinent imaging results/findings within the past 24 hours.      Assessment/Plan:      * Dehydration with hypernatremia  -Na 151 on admit due to dehydration from lack of PO access.  -Na 141 on 10/04  -Resolved with IVF. Tolerating regular diet  -No need for further labs at this time        Hypercalcemia  Ca 10.7 on admit  Due severe dehydration.   Ca 8.9 on 09/23, and WNL on 10/04  Resolved with IVF        Hypophosphatemia  Replace and monitor  Monitor for refeeding  Phos 3.0 on 10/04      Onychomycosis  Podiatry unable to trim toenails   Wound care trim toe nails.   Completed terbinafine x2 weeks for treatment on 10/05      Tinea pedis of both feet  Completed terbinafine x2 weeks for treatment on  10/05      Cerebral palsy  Consult social work for placement  Unsure if he has any family to care for him now that his father has - case management is working on this      SIRS (systemic inflammatory response syndrome)  This was most likely from dehydration and has now resolved.   No signs of infection          VTE Risk Mitigation (From admission, onward)         Ordered     IP VTE LOW RISK PATIENT  Once         22     Place sequential compression device  Until discontinued         22     Place DAPHNE hose  Until discontinued         22                Discharge Planning   RADHA:      Code Status: Full Code   Is the patient medically ready for discharge?:     Reason for patient still in hospital (select all that apply): Pending disposition  Discharge Plan A: Group Home   Discharge Delays: (!) Other (Attempting to locate next of kin.)              Stephenie Pappas DO  Department of Hospital Medicine   [facility] Surg

## 2022-10-09 NOTE — SUBJECTIVE & OBJECTIVE
Interval History:  No acute overnight events.  Patient remained afebrile.    Denies any pain at this time.  Eating breakfast this morning without issues    Review of Systems   Constitutional:  Negative for chills and fever.   Eyes:  Negative for visual disturbance.   Respiratory:  Negative for cough and shortness of breath.    Cardiovascular:  Negative for chest pain.   Gastrointestinal:  Negative for abdominal pain, nausea and vomiting.   Neurological:  Negative for dizziness and headaches.     Objective:     Vital Signs (Most Recent):  Temp: 97.4 °F (36.3 °C) (10/09/22 0809)  Pulse: 72 (10/09/22 0809)  Resp: 14 (10/09/22 0809)  BP: 111/68 (10/09/22 0809)  SpO2: 98 % (10/09/22 0809)   Vital Signs (24h Range):  Temp:  [97.4 °F (36.3 °C)-98.3 °F (36.8 °C)] 97.4 °F (36.3 °C)  Pulse:  [52-95] 72  Resp:  [14-18] 14  SpO2:  [93 %-98 %] 98 %  BP: (101-121)/(62-71) 111/68     Weight: 64.5 kg (142 lb 3.2 oz)  Body mass index is 19.29 kg/m².    Intake/Output Summary (Last 24 hours) at 10/9/2022 1132  Last data filed at 10/8/2022 1700  Gross per 24 hour   Intake 480 ml   Output 600 ml   Net -120 ml        Physical Exam  Vitals and nursing note reviewed.   Constitutional:       General: He is not in acute distress.     Appearance: He is ill-appearing (chronically ill appearing). He is not toxic-appearing.      Comments: Appears in good mood today   HENT:      Head: Atraumatic.      Mouth/Throat:      Mouth: Mucous membranes are moist.   Eyes:      Conjunctiva/sclera: Conjunctivae normal.   Cardiovascular:      Rate and Rhythm: Normal rate and regular rhythm.   Pulmonary:      Effort: Pulmonary effort is normal. No respiratory distress.      Breath sounds: No wheezing or rales.   Abdominal:      General: There is no distension.      Tenderness: There is no abdominal tenderness.   Skin:     General: Skin is warm and dry.      Comments: Toenails with onychomycosis. Bilateral feet with dry, crusting skin.    Neurological:       Mental Status: He is alert. Mental status is at baseline.      Comments: Hx of cerebral palsy. Able to answer yes and no to questions. Say he's doing okay   Psychiatric:         Mood and Affect: Mood normal.         Behavior: Behavior normal.       Significant Labs: All pertinent labs within the past 24 hours have been reviewed.    Significant Imaging: I have reviewed all pertinent imaging results/findings within the past 24 hours.

## 2022-10-10 PROCEDURE — 11000001 HC ACUTE MED/SURG PRIVATE ROOM

## 2022-10-10 PROCEDURE — 25000003 PHARM REV CODE 250: Performed by: INTERNAL MEDICINE

## 2022-10-10 PROCEDURE — 94761 N-INVAS EAR/PLS OXIMETRY MLT: CPT

## 2022-10-10 PROCEDURE — 25000003 PHARM REV CODE 250: Performed by: HOSPITALIST

## 2022-10-10 RX ADMIN — THERA TABS 1 TABLET: TAB at 08:10

## 2022-10-10 RX ADMIN — POLYETHYLENE GLYCOL 3350 17 G: 17 POWDER, FOR SOLUTION ORAL at 08:10

## 2022-10-10 NOTE — SUBJECTIVE & OBJECTIVE
Interval History:  No acute overnight events.  Patient remained afebrile.    Denies any pain at this time.  Eating breakfast this morning without issues    Review of Systems   Constitutional:  Negative for chills and fever.   Eyes:  Negative for visual disturbance.   Respiratory:  Negative for cough and shortness of breath.    Cardiovascular:  Negative for chest pain.   Gastrointestinal:  Negative for abdominal pain, nausea and vomiting.   Neurological:  Negative for dizziness and headaches.     Objective:     Vital Signs (Most Recent):  Temp: 98 °F (36.7 °C) (10/10/22 1206)  Pulse: 89 (10/10/22 1206)  Resp: 17 (10/10/22 1206)  BP: 104/64 (10/10/22 1206)  SpO2: (!) 94 % (10/10/22 1206)   Vital Signs (24h Range):  Temp:  [97.7 °F (36.5 °C)-98.5 °F (36.9 °C)] 98 °F (36.7 °C)  Pulse:  [41-89] 89  Resp:  [14-18] 17  SpO2:  [92 %-98 %] 94 %  BP: ()/(51-79) 104/64     Weight: 64.5 kg (142 lb 3.2 oz)  Body mass index is 19.29 kg/m².    Intake/Output Summary (Last 24 hours) at 10/10/2022 1230  Last data filed at 10/10/2022 0822  Gross per 24 hour   Intake 240 ml   Output 500 ml   Net -260 ml        Physical Exam  Vitals and nursing note reviewed.   Constitutional:       General: He is not in acute distress.     Appearance: He is ill-appearing (chronically ill appearing). He is not toxic-appearing.      Comments: Appears in good mood today   HENT:      Head: Atraumatic.      Mouth/Throat:      Mouth: Mucous membranes are moist.   Eyes:      Conjunctiva/sclera: Conjunctivae normal.   Cardiovascular:      Rate and Rhythm: Normal rate and regular rhythm.   Pulmonary:      Effort: Pulmonary effort is normal. No respiratory distress.      Breath sounds: No wheezing or rales.   Abdominal:      General: There is no distension.      Tenderness: There is no abdominal tenderness.   Skin:     General: Skin is warm and dry.      Comments: Toenails with onychomycosis. Bilateral feet with dry, crusting skin.    Neurological:       Mental Status: He is alert. Mental status is at baseline.      Comments: Hx of cerebral palsy. Able to answer yes and no to questions. Say he's doing okay   Psychiatric:         Mood and Affect: Mood normal.         Behavior: Behavior normal.       Significant Labs: All pertinent labs within the past 24 hours have been reviewed.    Significant Imaging: I have reviewed all pertinent imaging results/findings within the past 24 hours.

## 2022-10-10 NOTE — PROGRESS NOTES
Patient accepted by [facility].  JEREMIAH spoke with [facility] nurse, Marge, who requested copies of patient's labs, H&P, and MAR.  Patient will be seen by provider's PCP, Dr. Derek Olmstead.  Requested clinicals submitted to provider.      Anticipated discharge is 10/11/2022.

## 2022-10-10 NOTE — PROGRESS NOTES
Correction:  Patient will be accepted at group home:  [facility]; [address] [place]  [id number] on Wednesday 10/12/2022.  Conact #:  [phone].    90-I sent to be completed by physician.

## 2022-10-10 NOTE — PROGRESS NOTES
"[facility] Medicine  Progress Note    Patient Name: Miguelito Landin  MRN: [id number]  Patient Class: IP- Inpatient   Admission Date: 2022  Length of Stay: 19 days  Attending Physician: Stephenie Pappas DO  Primary Care Provider: Ventura Allan        Subjective:     Principal Problem:Dehydration with hypernatremia        HPI:  Mr. Landin is a 42yo man with a past medical history of cerebral palsy with spasticity and anxiety.  At baseline, he is normally cared for by his father at home.  He is a poor historian and cannot state to me what happened today.    Dr. Spring, the ED staff, was able to gather some collateral from the EMS on arrival.  Apparently his father and he had not been seen for quite some time, so a care check was initiated.  On arrival, it was found that the patient's father had , and the patient was confined to his bed due to his mobility issues (bed-bound).  The last known contact with the patient and his father was 22.  In the interim, the patient has had no water or food, and had to languish in his own urine and stool until help arrived today.  The patient would not speak to me about his father's death despite my bringing it up directly.  All he will say repeatedly is, "something happened."  EMS did notify the patient on arrival that his father was , at which point he did become extremely upset.    In the ED his VS's were BP (!) 152/94   Pulse (!) 158 -> 127 -> 114   Temp 98 °F (36.7 °C)   Resp 19   Ht 6' (1.829 m)   Wt 68 kg (150 lb)   SpO2 (!) 94% RA  BMI 20.34 kg/m².  Labs showed WBC 18, Hg 19, HCT 54, .  , HCO3 14, BUN 17, Cr 1.2, AG 24.  Gluc 115, Ca 10.7, TB 2.4, normal LFT's.  CPK 43, LA 1.7, BHB 4.6.  COVID NEG. VBG pH 7.39, PCO2 30.     No radiographs were done in the ED.  (CXR now ordered and pending though).  EKG showed sinus tachycardia to 148 with MARYAM and non-specific ST changes.     In the ED he was treated with a " "banana bag bolus , and NS 2L bolus .      Overview/Hospital Course:  Mr Miguelito Landin who is bedbound from cerebral palsy who was admitted with hypernatremia, hypercalcemia due to dehydration from lack of access to food/water after his caretaker/father . Electrolyte disturbances improved. Tolerating diet. He is reasonably having stress and anxiety related to this; PRN xanax available. Social work consulted for locating his next of kin (both parents , other family is in [facility]) and plans for his care going forward.  Patient became placement issue. Of note, tried to initiate DVT prophylaxis but patient refuses because needles causes him a lot of stress and anxiety. Discussed risks of possible DVT given bedbound status. Patient responds "okay". Awaiting placement. CM/SW assisting with placement and discharge disposition.       Interval History:  No acute overnight events.  Patient remained afebrile.    Denies any pain at this time.  Eating breakfast this morning without issues    Review of Systems   Constitutional:  Negative for chills and fever.   Eyes:  Negative for visual disturbance.   Respiratory:  Negative for cough and shortness of breath.    Cardiovascular:  Negative for chest pain.   Gastrointestinal:  Negative for abdominal pain, nausea and vomiting.   Neurological:  Negative for dizziness and headaches.     Objective:     Vital Signs (Most Recent):  Temp: 98 °F (36.7 °C) (10/10/22 1206)  Pulse: 89 (10/10/22 1206)  Resp: 17 (10/10/22 1206)  BP: 104/64 (10/10/22 1206)  SpO2: (!) 94 % (10/10/22 1206)   Vital Signs (24h Range):  Temp:  [97.7 °F (36.5 °C)-98.5 °F (36.9 °C)] 98 °F (36.7 °C)  Pulse:  [41-89] 89  Resp:  [14-18] 17  SpO2:  [92 %-98 %] 94 %  BP: ()/(51-79) 104/64     Weight: 64.5 kg (142 lb 3.2 oz)  Body mass index is 19.29 kg/m².    Intake/Output Summary (Last 24 hours) at 10/10/2022 1230  Last data filed at 10/10/2022 0822  Gross per 24 hour   Intake 240 ml "   Output 500 ml   Net -260 ml        Physical Exam  Vitals and nursing note reviewed.   Constitutional:       General: He is not in acute distress.     Appearance: He is ill-appearing (chronically ill appearing). He is not toxic-appearing.      Comments: Appears in good mood today   HENT:      Head: Atraumatic.      Mouth/Throat:      Mouth: Mucous membranes are moist.   Eyes:      Conjunctiva/sclera: Conjunctivae normal.   Cardiovascular:      Rate and Rhythm: Normal rate and regular rhythm.   Pulmonary:      Effort: Pulmonary effort is normal. No respiratory distress.      Breath sounds: No wheezing or rales.   Abdominal:      General: There is no distension.      Tenderness: There is no abdominal tenderness.   Skin:     General: Skin is warm and dry.      Comments: Toenails with onychomycosis. Bilateral feet with dry, crusting skin.    Neurological:      Mental Status: He is alert. Mental status is at baseline.      Comments: Hx of cerebral palsy. Able to answer yes and no to questions. Say he's doing okay   Psychiatric:         Mood and Affect: Mood normal.         Behavior: Behavior normal.       Significant Labs: All pertinent labs within the past 24 hours have been reviewed.    Significant Imaging: I have reviewed all pertinent imaging results/findings within the past 24 hours.      Assessment/Plan:      * Dehydration with hypernatremia  -Na 151 on admit due to dehydration from lack of PO access.  -Na 141 on 10/04  -Resolved with IVF. Tolerating regular diet  -No need for further labs at this time        Hypercalcemia  Ca 10.7 on admit  Due severe dehydration.   Ca 8.9 on 09/23, and WNL on 10/04  Resolved with IVF        Hypophosphatemia  Replace and monitor  Monitor for refeeding  Phos 3.0 on 10/04      Onychomycosis  Podiatry unable to trim toenails   Wound care trim toe nails.   Completed terbinafine x2 weeks for treatment on 10/05      Tinea pedis of both feet  Completed terbinafine x2 weeks for treatment  on 10/05      Cerebral palsy  Consult social work for placement  Unsure if he has any family to care for him now that his father has - case management is working on this      SIRS (systemic inflammatory response syndrome)  This was most likely from dehydration and has now resolved.   No signs of infection          VTE Risk Mitigation (From admission, onward)         Ordered     IP VTE LOW RISK PATIENT  Once         22     Place sequential compression device  Until discontinued         22     Place DAPHNE hose  Until discontinued         22                Discharge Planning   RADHA:      Code Status: Full Code   Is the patient medically ready for discharge?:     Reason for patient still in hospital (select all that apply): Pending disposition  Discharge Plan A: Group Home   Discharge Delays: (!) Other (Attempting to locate next of kin.)              Stephenie Pappas DO  Department of Hospital Medicine   [facility] - Med Surg

## 2022-10-10 NOTE — PROGRESS NOTES
"Today patient allowed the 2nd-5th left fingernails to be trimmed a small amount (~1"). Would not allow remainder of fingernails to be trimmed.   Combed hair and removed knots.   Wearing [employer] boots.   "

## 2022-10-10 NOTE — PROGRESS NOTES
Call received from Ms. Julisa Stiles inquiring if placement had been found for patient.  She also inquired as to what would be required if someone offered to care for patient.  JEREMIAH advised Ms. Lima that patient was still hospitalized.  She was advised that if someone came forward and offered to care for patient, they would have the same responsiblities as if they were caring for a child.  Ms. Stiles inquired if there were facilities available to care for individuals with disabilities.  JEREMIAH advised her that there were facilities available and that the Office for Citizens with Developmental Disabilities evaluates individuals to determine what would be the best setting for those needing placement.      Ms. Palm stated that she is the patient's second cousin but his other relatives are in [facility].  She stated that she would let them know that the patient is still hospitalized.      Ms. Palm stated that the family isolated themselves from others which may be a reason that no one has come forward to care for the patient.  She stated that the patient's parents also had mental health problems.

## 2022-10-10 NOTE — PLAN OF CARE
JEREMIAH received a email from Maggie at [facility] stating that they are willing to accept patient. Maggie stated that they will pick patient up Wednesday. Maggie also inquired about special equipment patient will need. JEREMIAH forwarded email to Izzy ALEJANDRE) to continue working on discharge planning.

## 2022-10-11 PROCEDURE — 11000001 HC ACUTE MED/SURG PRIVATE ROOM

## 2022-10-11 PROCEDURE — 25000003 PHARM REV CODE 250: Performed by: INTERNAL MEDICINE

## 2022-10-11 PROCEDURE — 25000003 PHARM REV CODE 250: Performed by: HOSPITALIST

## 2022-10-11 RX ADMIN — THERA TABS 1 TABLET: TAB at 09:10

## 2022-10-11 RX ADMIN — POLYETHYLENE GLYCOL 3350 17 G: 17 POWDER, FOR SOLUTION ORAL at 09:10

## 2022-10-11 NOTE — PROGRESS NOTES
No further recommendations at this time,   continue current regimen. PO intake remains 100%.  Pt cleared medically for D/C - Per SW patient has been able to be   placed in group home and will be discharging 10/12/2022.  Cindy Avila, CHRISTOPHER, RDN, LDN

## 2022-10-11 NOTE — PROGRESS NOTES
"[facility]  Progress Note    Patient Name: Miguelito Landin  MRN: [id number]  Patient Class: IP- Inpatient   Admission Date: 2022  Length of Stay: 20 days  Attending Physician: Derek Marie MD  [facility]        Subjective:     Principal Problem:Dehydration with hypernatremia        HPI:  Mr. Landin is a 42yo man with a past medical history of cerebral palsy with spasticity and anxiety.  At baseline, he is normally cared for by his father at home.  He is a poor historian and cannot state to me what happened today.    Dr. Spring, the ED staff, was able to gather some collateral from the EMS on arrival.  Apparently his father and he had not been seen for quite some time, so a care check was initiated.  On arrival, it was found that the patient's father had , and the patient was confined to his bed due to his mobility issues (bed-bound).  The last known contact with the patient and his father was 22.  In the interim, the patient has had no water or food, and had to languish in his own urine and stool until help arrived today.  The patient would not speak to me about his father's death despite my bringing it up directly.  All he will say repeatedly is, "something happened."  EMS did notify the patient on arrival that his father was , at which point he did become extremely upset.    In the ED his VS's were BP (!) 152/94   Pulse (!) 158 -> 127 -> 114   Temp 98 °F (36.7 °C)   Resp 19   Ht 6' (1.829 m)   Wt 68 kg (150 lb)   SpO2 (!) 94% RA  BMI 20.34 kg/m².  Labs showed WBC 18, Hg 19, HCT 54, .  , HCO3 14, BUN 17, Cr 1.2, AG 24.  Gluc 115, Ca 10.7, TB 2.4, normal LFT's.  CPK 43, LA 1.7, BHB 4.6.  COVID NEG. VBG pH 7.39, PCO2 30.     No radiographs were done in the ED.  (CXR now ordered and pending though).  EKG showed sinus tachycardia to 148 with MARYAM and non-specific ST changes.     In the ED he was treated with a " "banana bag bolus , and NS 2L bolus .      Overview/Hospital Course:  Mr Miguelito Landin who is bedbound from cerebral palsy who was admitted with hypernatremia, hypercalcemia due to dehydration from lack of access to food/water after his caretaker/father . Electrolyte disturbances improved. Tolerating diet. He is reasonably having stress and anxiety related to this; PRN xanax available. Social work consulted for locating his next of kin (both parents , other family is in [facility]) and plans for his care going forward.  Patient became placement issue. Of note, tried to initiate DVT prophylaxis but patient refuses because needles causes him a lot of stress and anxiety. Discussed risks of possible DVT given bedbound status. Patient responds "okay". Medically ready to go, Awaiting placement. CM/SW assisting with placement and discharge disposition.       Interval History:    NAEON. Denies SOB or CP.       Review of Systems   Constitutional:  Negative for chills and fever.   Eyes:  Negative for visual disturbance.   Respiratory:  Negative for cough and shortness of breath.    Cardiovascular:  Negative for chest pain.   Gastrointestinal:  Negative for abdominal pain, nausea and vomiting.   Skin:  Negative for pallor and rash.   Neurological:  Negative for dizziness and headaches.     Objective:     Vital Signs (Most Recent):  Temp: 98.1 °F (36.7 °C) (10/11/22 0737)  Pulse: 77 (10/11/22 0737)  Resp: 17 (10/11/22 0737)  BP: 101/61 (10/11/22 0737)  SpO2: 97 % (10/11/22 0737)   Vital Signs (24h Range):  Temp:  [97.1 °F (36.2 °C)-98.1 °F (36.7 °C)] 98.1 °F (36.7 °C)  Pulse:  [67-93] 77  Resp:  [16-17] 17  SpO2:  [94 %-98 %] 97 %  BP: ()/(59-81) 101/61     Weight: 64.5 kg (142 lb 3.2 oz)  Body mass index is 19.29 kg/m².    Intake/Output Summary (Last 24 hours) at 10/11/2022 4325  Last data filed at 10/11/2022 6092  Gross per 24 hour   Intake 960 ml   Output 1350 ml   Net -390 ml        Physical " Exam  Vitals and nursing note reviewed.   Constitutional:       General: He is not in acute distress.     Appearance: He is ill-appearing (chronically ill appearing). He is not toxic-appearing.      Comments: Appears in good mood today   HENT:      Head: Atraumatic.      Mouth/Throat:      Mouth: Mucous membranes are moist.   Eyes:      Conjunctiva/sclera: Conjunctivae normal.   Cardiovascular:      Rate and Rhythm: Normal rate and regular rhythm.   Pulmonary:      Effort: Pulmonary effort is normal. No respiratory distress.      Breath sounds: No wheezing or rales.   Abdominal:      General: There is no distension.      Tenderness: There is no abdominal tenderness.   Skin:     General: Skin is warm and dry.      Comments: Toenails with onychomycosis. Bilateral feet with dry, crusting skin.    Neurological:      Mental Status: He is alert. Mental status is at baseline.      Comments: Hx of cerebral palsy. Able to answer yes and no to questions. Say he's doing okay   Psychiatric:         Mood and Affect: Mood normal.         Behavior: Behavior normal.           No results found for this or any previous visit (from the past 24 hour(s)).    Microbiology Results (last 7 days)       ** No results found for the last 168 hours. **             Imaging Results              X-Ray Chest AP Portable (Final result)  Result time 09/21/22 04:44:58      Final result by Nicho Gorman MD (09/21/22 04:44:58)                   Impression:      Intrathoracic findings as above.      Electronically signed by: Nicho Gorman  Date:    09/21/2022  Time:    04:44               Narrative:    EXAMINATION:  XR CHEST AP PORTABLE    CLINICAL HISTORY:  SHORTNESS OF BREATH;    TECHNIQUE:  Single frontal view of the chest was performed.    COMPARISON:  None    FINDINGS:  Single portable chest view is submitted.  There is mild elevation of the left hemidiaphragm.  The cardiomediastinal silhouette appears appropriate.  Mild atelectatic change  noted.  There is no evidence for superimposed confluent infiltrate or consolidation, significant pleural effusion or pneumothorax.  Air-filled bowel loops of the left upper abdomen are noted.  The visualized osseous structures appear intact.                                            Assessment/Plan:      * Dehydration with hypernatremia  -Na 151 on admit due to dehydration from lack of PO access.  -Na 141 on 10/04  -Resolved with IVF. Tolerating regular diet  -No need for further labs at this time        Onychomycosis  Podiatry unable to trim toenails   Wound care trim toe nails.   Completed terbinafine x2 weeks for treatment on 10/05      Tinea pedis of both feet  Completed terbinafine x2 weeks for treatment on 10/05      Hypophosphatemia  Replace and monitor  Monitor for refeeding  Phos 3.0 on 10/04      Cerebral palsy  Consult social work for placement  Unsure if he has any family to care for him now that his father has - case management is working on this      Hypercalcemia  Ca 10.7 on admit  Due severe dehydration.   Ca 8.9 on , and WNL on 10/04  Resolved with IVF        SIRS (systemic inflammatory response syndrome)  This was most likely from dehydration and has now resolved.   No signs of infection          VTE Risk Mitigation (From admission, onward)         Ordered     IP VTE LOW RISK PATIENT  Once         22     Place sequential compression device  Until discontinued         22     Place DAPHNE hose  Until discontinued         22                Discharge Planning   RADHA:      Code Status: Full Code   Is the patient medically ready for discharge?:     Reason for patient still in hospital (select all that apply): Pending disposition  Discharge Plan A: Group Home   Discharge Delays: (!) Other (Attempting to locate next of kin.)              Derek Marie MD  Department of Hospital Medicine   [facility]

## 2022-10-11 NOTE — SUBJECTIVE & OBJECTIVE
Interval History:    NAEON. Denies SOB or CP.       Review of Systems   Constitutional:  Negative for chills and fever.   Eyes:  Negative for visual disturbance.   Respiratory:  Negative for cough and shortness of breath.    Cardiovascular:  Negative for chest pain.   Gastrointestinal:  Negative for abdominal pain, nausea and vomiting.   Skin:  Negative for pallor and rash.   Neurological:  Negative for dizziness and headaches.     Objective:     Vital Signs (Most Recent):  Temp: 98.1 °F (36.7 °C) (10/11/22 0737)  Pulse: 77 (10/11/22 0737)  Resp: 17 (10/11/22 0737)  BP: 101/61 (10/11/22 0737)  SpO2: 97 % (10/11/22 0737)   Vital Signs (24h Range):  Temp:  [97.1 °F (36.2 °C)-98.1 °F (36.7 °C)] 98.1 °F (36.7 °C)  Pulse:  [67-93] 77  Resp:  [16-17] 17  SpO2:  [94 %-98 %] 97 %  BP: ()/(59-81) 101/61     Weight: 64.5 kg (142 lb 3.2 oz)  Body mass index is 19.29 kg/m².    Intake/Output Summary (Last 24 hours) at 10/11/2022 0857  Last data filed at 10/11/2022 0418  Gross per 24 hour   Intake 960 ml   Output 1350 ml   Net -390 ml        Physical Exam  Vitals and nursing note reviewed.   Constitutional:       General: He is not in acute distress.     Appearance: He is ill-appearing (chronically ill appearing). He is not toxic-appearing.      Comments: Appears in good mood today   HENT:      Head: Atraumatic.      Mouth/Throat:      Mouth: Mucous membranes are moist.   Eyes:      Conjunctiva/sclera: Conjunctivae normal.   Cardiovascular:      Rate and Rhythm: Normal rate and regular rhythm.   Pulmonary:      Effort: Pulmonary effort is normal. No respiratory distress.      Breath sounds: No wheezing or rales.   Abdominal:      General: There is no distension.      Tenderness: There is no abdominal tenderness.   Skin:     General: Skin is warm and dry.      Comments: Toenails with onychomycosis. Bilateral feet with dry, crusting skin.    Neurological:      Mental Status: He is alert. Mental status is at baseline.       Comments: Hx of cerebral palsy. Able to answer yes and no to questions. Say he's doing okay   Psychiatric:         Mood and Affect: Mood normal.         Behavior: Behavior normal.           No results found for this or any previous visit (from the past 24 hour(s)).    Microbiology Results (last 7 days)       ** No results found for the last 168 hours. **             Imaging Results              X-Ray Chest AP Portable (Final result)  Result time 09/21/22 04:44:58      Final result by Nicho Gorman MD (09/21/22 04:44:58)                   Impression:      Intrathoracic findings as above.      Electronically signed by: Nicho Gorman  Date:    09/21/2022  Time:    04:44               Narrative:    EXAMINATION:  XR CHEST AP PORTABLE    CLINICAL HISTORY:  SHORTNESS OF BREATH;    TECHNIQUE:  Single frontal view of the chest was performed.    COMPARISON:  None    FINDINGS:  Single portable chest view is submitted.  There is mild elevation of the left hemidiaphragm.  The cardiomediastinal silhouette appears appropriate.  Mild atelectatic change noted.  There is no evidence for superimposed confluent infiltrate or consolidation, significant pleural effusion or pneumothorax.  Air-filled bowel loops of the left upper abdomen are noted.  The visualized osseous structures appear intact.

## 2022-10-12 VITALS
BODY MASS INDEX: 19.26 KG/M2 | WEIGHT: 142.19 LBS | RESPIRATION RATE: 17 BRPM | DIASTOLIC BLOOD PRESSURE: 73 MMHG | HEIGHT: 72 IN | SYSTOLIC BLOOD PRESSURE: 115 MMHG | HEART RATE: 86 BPM | TEMPERATURE: 98 F | OXYGEN SATURATION: 95 %

## 2022-10-12 LAB — SARS-COV-2 RDRP RESP QL NAA+PROBE: NEGATIVE

## 2022-10-12 PROCEDURE — 25000003 PHARM REV CODE 250: Performed by: HOSPITALIST

## 2022-10-12 PROCEDURE — 25000003 PHARM REV CODE 250: Performed by: INTERNAL MEDICINE

## 2022-10-12 PROCEDURE — U0002 COVID-19 LAB TEST NON-CDC: HCPCS | Performed by: STUDENT IN AN ORGANIZED HEALTH CARE EDUCATION/TRAINING PROGRAM

## 2022-10-12 RX ADMIN — POLYETHYLENE GLYCOL 3350 17 G: 17 POWDER, FOR SOLUTION ORAL at 08:10

## 2022-10-12 RX ADMIN — THERA TABS 1 TABLET: TAB at 08:10

## 2022-10-12 NOTE — PLAN OF CARE
[facility] - Med Surg  Discharge Final Note    Primary Care Provider: Ventura Allan    Expected Discharge Date: 10/12/2022    All needs met. JEREMIAH notified patient's cousin, Julisa that patient is discharging to [facility] in [place], [place]. JEREMIAH informed that transportation should arrive in 2-3 hours to pick patient up. JEREMIAH notified nurse Helen that patient is ready for discharge from case management standpoint. JEREMIAH instructed Helen to contact JEREMIAH Barrera at group home when patient has been picked up by transportation.     ADT 30 order placed for [facility] [employer].  Requested  time: NOW (ambulance)  If transportation does not arrive at ETA time nurse will be instructed to follow protocol for transportation below:  How can I get in touch directly with dispatch, if needed?                 Non-emergent dispatch: [phone]      +++NURSING:  If Van does not arrive at requested time please call the above Non Emergent Dispatcher.  If issue not resolved please escalate to your charge nurse for further instructions.      Final Discharge Note (most recent)       Final Note - 10/12/22 1134          Final Note    Assessment Type Final Discharge Note     Anticipated Discharge Disposition --   Group Home    What phone number can be called within the next 1-3 days to see how you are doing after discharge? [phone]     Hospital Resources/Appts/Education Provided --   Group home to schedule appointments.       Post-Acute Status    Post-Acute Authorization Placement   Group Home    Post-Acute Placement Status Set-up Complete/Auth obtained     Coverage [employer] Man[employer] Medicare     Discharge Delays None known at this time                     Important Message from Medicare

## 2022-10-12 NOTE — PLAN OF CARE
[facility]     Department of Hospital Medicine     [address] [place] [address]     [phone] after hours  [phone] fax       NURSING HOME ORDERS    10/12/2022    Admit to Nursing Home:  Regular Bed                                                    Diagnoses:  Active Hospital Problems    Diagnosis  POA    *Dehydration with hypernatremia [E86.0, E87.0]  Yes    SIRS (systemic inflammatory response syndrome) [R65.10]  Yes    Hypercalcemia [E83.52]  Yes    Cerebral palsy [G80.9]  Yes    Hypophosphatemia [E83.39]  Yes    Tinea pedis of both feet [B35.3]  Yes    Onychomycosis [B35.1]  Yes      Resolved Hospital Problems   No resolved problems to display.       Patient is homebound due to:  Dehydration with hypernatremia    Allergies:  Review of patient's allergies indicates:   Allergen Reactions    Doxycycline Rash       Vitals:       Routine    Diet: adult regular    Acitivities:      - Up in a chair each morning as tolerated  - Advance as tolerated    LABS:  Per facility protocol    Nursing Precautions:     - Aspiration precautions:             - Total assistance with meals            -  Upright 90 degrees befor during and after meals             -  Suction at bedside          - Fall precautions per nursing home protocol   - Seizure precaution per long-term protocol   - Decubitus precautions:        -  for positioning   - Pressure reducing foam mattress   - Turn patient every two hours. Use wedge pillows to anchor patient    CONSULTS:  n/a    MISCELLANEOUS CARE:      Routine Skin for Bedridden Patients    Medications: Discontinue all previous medication orders, if any. See new list below.    None       Medication List      You have not been prescribed any medications.               _________________________________  Derek Marie MD  10/12/2022

## 2022-10-12 NOTE — PROGRESS NOTES
Correction:  Patient will be accepted at group home:  [facility]; [address] [facility]  [id number] on Wednesday 10/12/2022.  Contact #:  [phone].  Address:  [address] [place] [address]    Carmen is the  if they get lost her number is   [phone]    90-L to be completed by physician.

## 2022-10-12 NOTE — PLAN OF CARE
JEREMIAH spoke with JEREMIAH Barrera at [facility]. Holly informed JEREMIAH that they are ready for patient and have received all information needed. JEREMIAH informed Holly that plan of care was emailed to Marge Nguyen RN. Holly inquired if plan of care could be emailed to her along with any social information about patient. JEREMIAH emailed information. JEREMIAH informed Holly that transportation will be scheduled for now so ambulance can be at hospital to  patient in the next two to three hours. JEREMIAH informed Holly that she will be notified when patient is picked up.

## 2022-10-12 NOTE — DISCHARGE SUMMARY
"[facility] Medicine  Discharge Summary      Patient Name: Miguelito Landin  MRN: [id number]  Patient Class: IP- Inpatient  Admission Date: 2022  Hospital Length of Stay: 21 days  Discharge Date and Time:  10/12/2022 8:55 AM  Attending Physician: Derek Marie MD   Discharging Provider: Derek Marie MD  Primary Care Provider: [facility]      HPI:   Mr. Landin is a 42yo man with a past medical history of cerebral palsy with spasticity and anxiety.  At baseline, he is normally cared for by his father at home.  He is a poor historian and cannot state to me what happened today.    Dr. Spring, the ED staff, was able to gather some collateral from the EMS on arrival.  Apparently his father and he had not been seen for quite some time, so a care check was initiated.  On arrival, it was found that the patient's father had , and the patient was confined to his bed due to his mobility issues (bed-bound).  The last known contact with the patient and his father was 22.  In the interim, the patient has had no water or food, and had to languish in his own urine and stool until help arrived today.  The patient would not speak to me about his father's death despite my bringing it up directly.  All he will say repeatedly is, "something happened."  EMS did notify the patient on arrival that his father was , at which point he did become extremely upset.    In the ED his VS's were BP (!) 152/94   Pulse (!) 158 -> 127 -> 114   Temp 98 °F (36.7 °C)   Resp 19   Ht 6' (1.829 m)   Wt 68 kg (150 lb)   SpO2 (!) 94% RA  BMI 20.34 kg/m².  Labs showed WBC 18, Hg 19, HCT 54, .  , HCO3 14, BUN 17, Cr 1.2, AG 24.  Gluc 115, Ca 10.7, TB 2.4, normal LFT's.  CPK 43, LA 1.7, BHB 4.6.  COVID NEG. VBG pH 7.39, PCO2 30.     No radiographs were done in the ED.  (CXR now ordered and pending though).  EKG showed sinus tachycardia to 148 with MARYAM and non-specific ST changes.     In the ED " "he was treated with a banana bag bolus , and NS 2L bolus .      * No surgery found *      Hospital Course:   Mr Miguelito Landin who is bedbound from cerebral palsy who was admitted with hypernatremia, hypercalcemia due to dehydration from lack of access to food/water after his caretaker/father . Electrolyte disturbances improved. Tolerating diet. He is reasonably having stress and anxiety related to this; PRN xanax available. Social work consulted for locating his next of kin (both parents , other family is in [facility]) and plans for his care going forward.  Patient became placement issue. Of note, tried to initiate DVT prophylaxis but patient refuses because needles causes him a lot of stress and anxiety. Discussed risks of possible DVT given bedbound status. Patient responds "okay". Medically ready to go, Awaiting placement. CM/SW assisting with placement and discharge disposition.      Interval History:    NAEON. Denies SOB or CP.         Review of Systems   Constitutional:  Negative for chills and fever.   Eyes:  Negative for visual disturbance.   Respiratory:  Negative for cough and shortness of breath.    Cardiovascular:  Negative for chest pain.   Gastrointestinal:  Negative for abdominal pain, nausea and vomiting.   Skin:  Negative for pallor and rash.   Neurological:  Negative for dizziness and headaches.      Objective:      Vital Signs (Most Recent):  Temp: 98.1 °F (36.7 °C) (10/11/22 0737)  Pulse: 77 (10/11/22 0737)  Resp: 17 (10/11/22 0737)  BP: 101/61 (10/11/22 0737)  SpO2: 97 % (10/11/22 0737)    Vital Signs (24h Range):  Temp:  [97.1 °F (36.2 °C)-98.1 °F (36.7 °C)] 98.1 °F (36.7 °C)  Pulse:  [67-93] 77  Resp:  [16-17] 17  SpO2:  [94 %-98 %] 97 %  BP: ()/(59-81) 101/61      Weight: 64.5 kg (142 lb 3.2 oz)  Body mass index is 19.29 kg/m².     Intake/Output Summary (Last 24 hours) at 10/11/2022 9935  Last data filed at 10/11/2022 8230      Gross per 24 hour   Intake 960 ml "   Output 1350 ml   Net -390 ml         Physical Exam  Vitals and nursing note reviewed.   Constitutional:       General: He is not in acute distress.     Appearance: He is ill-appearing (chronically ill appearing). He is not toxic-appearing.      Comments: Appears in good mood today   HENT:      Head: Atraumatic.      Mouth/Throat:      Mouth: Mucous membranes are moist.   Eyes:      Conjunctiva/sclera: Conjunctivae normal.   Cardiovascular:      Rate and Rhythm: Normal rate and regular rhythm.   Pulmonary:      Effort: Pulmonary effort is normal. No respiratory distress.      Breath sounds: No wheezing or rales.   Abdominal:      General: There is no distension.      Tenderness: There is no abdominal tenderness.   Skin:     General: Skin is warm and dry.      Comments: Toenails with onychomycosis. Bilateral feet with dry, crusting skin.    Neurological:      Mental Status: He is alert. Mental status is at baseline.      Comments: Hx of cerebral palsy. Able to answer yes and no to questions. Say he's doing okay   Psychiatric:         Mood and Affect: Mood normal.         Behavior: Behavior normal.        Goals of Care Treatment Preferences:  Code Status: Full Code      Consults:   Consults (From admission, onward)        Status Ordering Provider     Inpatient consult to Registered Dietitian/Nutritionist  Once        Provider:  (Not yet assigned)    Completed VANDANA FORTE     Inpatient consult to Social Work  Once        Provider:  (Not yet assigned)    Completed REJI OLIVER          No new Assessment & Plan notes have been filed under this hospital service since the last note was generated.  Service: Hospital Medicine    Final Active Diagnoses:    Diagnosis Date Noted POA    PRINCIPAL PROBLEM:  Dehydration with hypernatremia [E86.0, E87.0] 09/21/2022 Yes    SIRS (systemic inflammatory response syndrome) [R65.10] 09/21/2022 Yes    Hypercalcemia [E83.52] 09/21/2022 Yes    Cerebral palsy [G80.9]  09/21/2022 Yes    Hypophosphatemia [E83.39] 09/21/2022 Yes    Tinea pedis of both feet [B35.3] 09/21/2022 Yes    Onychomycosis [B35.1] 09/21/2022 Yes      Problems Resolved During this Admission:       Discharged Condition: fair    Disposition:     Follow Up:    Patient Instructions:      Ambulatory referral/consult to Internal Medicine   Standing Status: Future   Referral Priority: Routine Referral Type: Consultation   Referral Reason: Specialty Services Required   Requested Specialty: Internal Medicine   Number of Visits Requested: 1     Ambulatory referral/consult to Podiatry   Standing Status: Future   Referral Priority: Routine Referral Type: Consultation   Referral Reason: Specialty Services Required   Requested Specialty: Podiatry   Number of Visits Requested: 1       Significant Diagnostic Studies:    Recent Results (from the past 100 hour(s))   COVID-19 Rapid Screening    Collection Time: 10/12/22  5:22 AM   Result Value Ref Range    SARS-CoV-2 RNA, Amplification, Qual Negative Negative       Microbiology Results (last 7 days)     ** No results found for the last 168 hours. **          Imaging Results          X-Ray Chest AP Portable (Final result)  Result time 09/21/22 04:44:58    Final result by Nicho Gorman MD (09/21/22 04:44:58)                 Impression:      Intrathoracic findings as above.      Electronically signed by: Nicho Gorman  Date:    09/21/2022  Time:    04:44             Narrative:    EXAMINATION:  XR CHEST AP PORTABLE    CLINICAL HISTORY:  SHORTNESS OF BREATH;    TECHNIQUE:  Single frontal view of the chest was performed.    COMPARISON:  None    FINDINGS:  Single portable chest view is submitted.  There is mild elevation of the left hemidiaphragm.  The cardiomediastinal silhouette appears appropriate.  Mild atelectatic change noted.  There is no evidence for superimposed confluent infiltrate or consolidation, significant pleural effusion or pneumothorax.  Air-filled bowel loops  of the left upper abdomen are noted.  The visualized osseous structures appear intact.                                    Pending Diagnostic Studies:     None         Medications:  Reconciled Home Medications:      Medication List      You have not been prescribed any medications.         Indwelling Lines/Drains at time of discharge:   Lines/Drains/Airways     Drain  Duration           Male External Urinary Catheter 10/01/22 1033 10 days                Time spent on the discharge of patient: 35 minutes         Derek Marie MD  Department of Hospital Medicine  [facility]

## 2022-10-14 ENCOUNTER — PATIENT OUTREACH (OUTPATIENT)
Dept: ADMINISTRATIVE | Facility: CLINIC | Age: 42
End: 2022-10-14
Payer: MEDICAID

## 2022-12-08 NOTE — PROGRESS NOTES
"[facility] Medicine  Progress Note    Patient Name: Miguelito Landin  MRN: [id number]  Patient Class: IP- Inpatient   Admission Date: 2022  Length of Stay: 21 days  Attending Physician: No att. providers found  Primary Care Provider: Ventura Allan        Subjective:     Principal Problem:Dehydration with hypernatremia        HPI:  Mr. Landin is a 40yo man with a past medical history of cerebral palsy with spasticity and anxiety.  At baseline, he is normally cared for by his father at home.  He is a poor historian and cannot state to me what happened today.    Dr. Spring, the ED staff, was able to gather some collateral from the EMS on arrival.  Apparently his father and he had not been seen for quite some time, so a care check was initiated.  On arrival, it was found that the patient's father had , and the patient was confined to his bed due to his mobility issues (bed-bound).  The last known contact with the patient and his father was 22.  In the interim, the patient has had no water or food, and had to languish in his own urine and stool until help arrived today.  The patient would not speak to me about his father's death despite my bringing it up directly.  All he will say repeatedly is, "something happened."  EMS did notify the patient on arrival that his father was , at which point he did become extremely upset.    In the ED his VS's were BP (!) 152/94   Pulse (!) 158 -> 127 -> 114   Temp 98 °F (36.7 °C)   Resp 19   Ht 6' (1.829 m)   Wt 68 kg (150 lb)   SpO2 (!) 94% RA  BMI 20.34 kg/m².  Labs showed WBC 18, Hg 19, HCT 54, .  , HCO3 14, BUN 17, Cr 1.2, AG 24.  Gluc 115, Ca 10.7, TB 2.4, normal LFT's.  CPK 43, LA 1.7, BHB 4.6.  COVID NEG. VBG pH 7.39, PCO2 30.     No radiographs were done in the ED.  (CXR now ordered and pending though).  EKG showed sinus tachycardia to 148 with MARYAM and non-specific ST changes.     In the ED he was treated " "with a banana bag bolus , and NS 2L bolus .      Overview/Hospital Course:  Mr Miguelito Landin who is bedbound from cerebral palsy who was admitted with hypernatremia, hypercalcemia due to dehydration from lack of access to food/water after his caretaker/father . Electrolyte disturbances improved. Tolerating diet. He is reasonably having stress and anxiety related to this; PRN xanax available. Social work consulted for locating his next of kin (both parents , other family is in [facility]) and plans for his care going forward.  Patient became placement issue. Of note, tried to initiate DVT prophylaxis but patient refuses because needles causes him a lot of stress and anxiety. Discussed risks of possible DVT given bedbound status. Patient responds "okay". Medically ready to go, Awaiting placement. CM/SW assisting with placement and discharge disposition.       No new subjective & objective note has been filed under this hospital service since the last note was generated.      Assessment/Plan:      * Dehydration with hypernatremia  -Na 151 on admit due to dehydration from lack of PO access.  -Na 141 on 10/04  -Resolved with IVF. Tolerating regular diet  -No need for further labs at this time        Onychomycosis  Podiatry unable to trim toenails   Wound care trim toe nails.   Completed terbinafine x2 weeks for treatment on 10/05      Tinea pedis of both feet  Completed terbinafine x2 weeks for treatment on 10/05      Hypophosphatemia  Replace and monitor  Monitor for refeeding  Phos 3.0 on 10/04      Cerebral palsy  Consult social work for placement  Unsure if he has any family to care for him now that his father has - case management is working on this      Hypercalcemia  Ca 10.7 on admit  Due severe dehydration.   Ca 8.9 on , and WNL on 10/04  Resolved with IVF        SIRS (systemic inflammatory response syndrome)  This was most likely from dehydration and has now resolved.   No signs " of infection          VTE Risk Mitigation (From admission, onward)           Ordered     IP VTE LOW RISK PATIENT  Once         09/21/22 0433                    Discharge Planning   RADHA: 10/12/2022     Code Status: Prior   Is the patient medically ready for discharge?:     Reason for patient still in hospital (select all that apply):   Discharge Plan A: Group Home   Discharge Delays: None known at this time              Blade Jones MD  Department of Hospital Medicine   [facility]

## 2023-01-03 ENCOUNTER — LAB VISIT (OUTPATIENT)
Dept: FAMILY MEDICINE | Facility: CLINIC | Age: 43
End: 2023-01-03
Payer: MEDICAID

## 2023-01-03 LAB
CTP QC/QA: YES
SARS-COV-2 AG RESP QL IA.RAPID: NEGATIVE

## 2023-06-15 NOTE — SUBJECTIVE & OBJECTIVE
Interval History:  No new issues     Review of Systems   Constitutional:  Positive for activity change. Negative for appetite change, chills, diaphoresis and fatigue.   HENT:  Negative for congestion and dental problem.    Eyes:  Negative for discharge and itching.   Respiratory:  Negative for apnea and chest tightness.    Cardiovascular:  Negative for chest pain and leg swelling.   Gastrointestinal:  Negative for abdominal distention and abdominal pain.   Endocrine: Negative for cold intolerance.   Genitourinary:  Negative for difficulty urinating and dysuria.   Musculoskeletal:  Negative for arthralgias and back pain.   Neurological:  Negative for dizziness.   Psychiatric/Behavioral:  Negative for agitation and behavioral problems.    Objective:     Vital Signs (Most Recent):  Temp: 98.2 °F (36.8 °C) (09/26/22 0806)  Pulse: 110 (09/26/22 0806)  Resp: 18 (09/26/22 0806)  BP: 127/82 (09/26/22 0807)  SpO2: (!) 94 % (09/26/22 0806)   Vital Signs (24h Range):  Temp:  [97.4 °F (36.3 °C)-98.9 °F (37.2 °C)] 98.2 °F (36.8 °C)  Pulse:  [] 110  Resp:  [14-19] 18  SpO2:  [92 %-95 %] 94 %  BP: (127-168)/(82-99) 127/82     Weight: 64.5 kg (142 lb 3.2 oz)  Body mass index is 19.29 kg/m².    Intake/Output Summary (Last 24 hours) at 9/26/2022 0919  Last data filed at 9/26/2022 0830  Gross per 24 hour   Intake 840 ml   Output 250 ml   Net 590 ml      Physical Exam  Vitals and nursing note reviewed.   Constitutional:       General: He is not in acute distress.     Appearance: He is ill-appearing. He is not toxic-appearing.   HENT:      Head: Normocephalic and atraumatic.   Eyes:      Conjunctiva/sclera: Conjunctivae normal.   Cardiovascular:      Rate and Rhythm: Normal rate and regular rhythm.   Pulmonary:      Effort: Pulmonary effort is normal. No respiratory distress.   Abdominal:      General: There is no distension.   Skin:     General: Skin is warm and dry.   Neurological:      Mental Status: He is alert and oriented to  person, place, and time.   Psychiatric:         Mood and Affect: Mood normal.         Behavior: Behavior normal.       Significant Labs: All pertinent labs within the past 24 hours have been reviewed.  BMP: No results for input(s): GLU, NA, K, CL, CO2, BUN, CREATININE, CALCIUM, MG in the last 48 hours.  CBC: No results for input(s): WBC, HGB, HCT, PLT in the last 48 hours.    Significant Imaging:    none

## 2023-09-08 ENCOUNTER — HOSPITAL ENCOUNTER (EMERGENCY)
Facility: HOSPITAL | Age: 43
Discharge: HOME | End: 2023-09-09
Attending: EMERGENCY MEDICINE
Payer: MEDICARE

## 2023-09-08 DIAGNOSIS — R00.0 TACHYCARDIA: ICD-10-CM

## 2023-09-08 DIAGNOSIS — J06.9 VIRAL URI WITH COUGH: Primary | ICD-10-CM

## 2023-09-08 LAB
BASOPHILS # BLD AUTO: 0.03 X10(3)/MCL
BASOPHILS NFR BLD AUTO: 0.3 %
EOSINOPHIL # BLD AUTO: 0.07 X10(3)/MCL (ref 0–0.9)
EOSINOPHIL NFR BLD AUTO: 0.7 %
ERYTHROCYTE [DISTWIDTH] IN BLOOD BY AUTOMATED COUNT: 12.4 % (ref 11.5–17)
HCT VFR BLD AUTO: 48.1 % (ref 42–52)
HGB BLD-MCNC: 15.8 G/DL (ref 14–18)
IMM GRANULOCYTES # BLD AUTO: 0.02 X10(3)/MCL (ref 0–0.04)
IMM GRANULOCYTES NFR BLD AUTO: 0.2 %
LYMPHOCYTES # BLD AUTO: 1.28 X10(3)/MCL (ref 0.6–4.6)
LYMPHOCYTES NFR BLD AUTO: 12.1 %
MCH RBC QN AUTO: 29.4 PG (ref 27–31)
MCHC RBC AUTO-ENTMCNC: 32.8 G/DL (ref 33–36)
MCV RBC AUTO: 89.4 FL (ref 80–94)
MONOCYTES # BLD AUTO: 0.52 X10(3)/MCL (ref 0.1–1.3)
MONOCYTES NFR BLD AUTO: 4.9 %
NEUTROPHILS # BLD AUTO: 8.69 X10(3)/MCL (ref 2.1–9.2)
NEUTROPHILS NFR BLD AUTO: 81.8 %
NRBC BLD AUTO-RTO: 0 %
PLATELET # BLD AUTO: 307 X10(3)/MCL (ref 130–400)
PMV BLD AUTO: 9.3 FL (ref 7.4–10.4)
RBC # BLD AUTO: 5.38 X10(6)/MCL (ref 4.7–6.1)
WBC # SPEC AUTO: 10.61 X10(3)/MCL (ref 4.5–11.5)

## 2023-09-08 PROCEDURE — 85025 COMPLETE CBC W/AUTO DIFF WBC: CPT | Performed by: EMERGENCY MEDICINE

## 2023-09-08 PROCEDURE — 0240U COVID/FLU A&B PCR: CPT | Performed by: EMERGENCY MEDICINE

## 2023-09-08 PROCEDURE — 25000003 PHARM REV CODE 250: Performed by: EMERGENCY MEDICINE

## 2023-09-08 PROCEDURE — 96360 HYDRATION IV INFUSION INIT: CPT

## 2023-09-08 PROCEDURE — 80048 BASIC METABOLIC PNL TOTAL CA: CPT | Performed by: EMERGENCY MEDICINE

## 2023-09-08 PROCEDURE — 93005 ELECTROCARDIOGRAM TRACING: CPT

## 2023-09-08 PROCEDURE — 87651 STREP A DNA AMP PROBE: CPT | Performed by: EMERGENCY MEDICINE

## 2023-09-08 PROCEDURE — 99285 EMERGENCY DEPT VISIT HI MDM: CPT | Mod: 25

## 2023-09-08 RX ADMIN — SODIUM CHLORIDE 1000 ML: 9 INJECTION, SOLUTION INTRAVENOUS at 11:09

## 2023-09-09 VITALS
HEIGHT: 73 IN | SYSTOLIC BLOOD PRESSURE: 133 MMHG | DIASTOLIC BLOOD PRESSURE: 92 MMHG | OXYGEN SATURATION: 98 % | RESPIRATION RATE: 19 BRPM | TEMPERATURE: 99 F | BODY MASS INDEX: 18.82 KG/M2 | HEART RATE: 105 BPM | WEIGHT: 142 LBS

## 2023-09-09 LAB
ANION GAP SERPL CALC-SCNC: 11 MEQ/L
BUN SERPL-MCNC: 17.9 MG/DL (ref 8.9–20.6)
CALCIUM SERPL-MCNC: 10.7 MG/DL (ref 8.4–10.2)
CHLORIDE SERPL-SCNC: 115 MMOL/L (ref 98–107)
CO2 SERPL-SCNC: 22 MMOL/L (ref 22–29)
CREAT SERPL-MCNC: 1.03 MG/DL (ref 0.73–1.18)
CREAT/UREA NIT SERPL: 17
FLUAV AG UPPER RESP QL IA.RAPID: NOT DETECTED
FLUBV AG UPPER RESP QL IA.RAPID: NOT DETECTED
GFR SERPLBLD CREATININE-BSD FMLA CKD-EPI: >60 MLS/MIN/1.73/M2
GLUCOSE SERPL-MCNC: 117 MG/DL (ref 74–100)
POTASSIUM SERPL-SCNC: 4 MMOL/L (ref 3.5–5.1)
SARS-COV-2 RNA RESP QL NAA+PROBE: NOT DETECTED
SODIUM SERPL-SCNC: 148 MMOL/L (ref 136–145)
STREP A PCR (OHS): NOT DETECTED

## 2023-09-09 RX ORDER — BENZONATATE 100 MG/1
100 CAPSULE ORAL 3 TIMES DAILY PRN
Qty: 20 CAPSULE | Refills: 0 | Status: SHIPPED | OUTPATIENT
Start: 2023-09-09 | End: 2023-09-19

## 2023-09-09 NOTE — ED PROVIDER NOTES
ED PROVIDER NOTE  9/8/2023    CHIEF COMPLAINT:   Chief Complaint   Patient presents with    Cough    Generalized Body Aches     Cough, body aches, sore throat, and subjective fever since earlier today. History of cerebal palsy and is current seated in wheelchair. Current temp=99.0 and was given motrin approximately 4 hours prior to arrival. nadn       HISTORY OF PRESENT ILLNESS:   Miguelito Landin is a 42 y.o. male who presents with chief complaint Flu-like symptoms. Onset was today when he began having sore throat associated with nonproductive cough, bodyaches, fever, and runny nose. Symptoms constant, improved somewhat with ibuprofen. No known sick contacts. Denies chest pain, nausea, vomiting, diarrhea.    The history is provided by the patient and a caregiver.         REVIEW OF SYSTEMS: as noted in the HPI.  NURSING NOTES REVIEWED      PAST MEDICAL/SURGICAL HISTORY:   Past Medical History:   Diagnosis Date    Anxiety     CP (cerebral palsy)     Spasticity     No past surgical history on file.    FAMILY HISTORY:   Family History   Problem Relation Age of Onset    Panic disorder Mother     Migraines Mother     Cancer Paternal Uncle     Heart attack Maternal Grandmother        SOCIAL HISTORY:   Social History     Tobacco Use    Smokeless tobacco: Never   Substance Use Topics    Alcohol use: No    Drug use: No       ALLERGIES:   Review of patient's allergies indicates:   Allergen Reactions    Doxycycline Rash       PHYSICAL EXAM:  Initial Vitals [09/08/23 2302]   BP Pulse Resp Temp SpO2   (!) 142/82 (!) 125 18 99 °F (37.2 °C) 97 %      MAP       --         Physical Exam    Nursing note and vitals reviewed.  Constitutional: He has a sickly appearance. No distress.   Diffuse muscle atrophy   HENT:   Head: Normocephalic and atraumatic.   Nose: Nose normal.   Mouth/Throat: Mucous membranes are dry. Posterior oropharyngeal erythema present.   Eyes: Conjunctivae and EOM are normal. Pupils are equal, round, and  reactive to light.   Neck: Neck supple. No tracheal deviation present.   Cardiovascular:  Regular rhythm, normal heart sounds, intact distal pulses and normal pulses.   Tachycardia present.         Pulmonary/Chest: Effort normal. No respiratory distress.   Abdominal: Abdomen is soft. There is no abdominal tenderness. There is no rebound and no guarding.   Musculoskeletal:      Cervical back: Neck supple.      Comments: Diffuse muscle atrophy. Heel protection boots to bilateral lower extremities.     Neurological: He is alert and oriented to person, place, and time. GCS eye subscore is 4. GCS verbal subscore is 5. GCS motor subscore is 6.   Skin: Skin is warm, dry and intact.   Psychiatric: He has a normal mood and affect. His speech is normal and behavior is normal. Judgment and thought content normal. Cognition and memory are normal.         RESULTS:  Labs Reviewed   BASIC METABOLIC PANEL - Abnormal; Notable for the following components:       Result Value    Sodium Level 148 (*)     Chloride 115 (*)     Glucose Level 117 (*)     Calcium Level Total 10.7 (*)     All other components within normal limits   CBC WITH DIFFERENTIAL - Abnormal; Notable for the following components:    MCHC 32.8 (*)     All other components within normal limits   COVID/FLU A&B PCR - Normal    Narrative:     The Xpert Xpress SARS-CoV-2/FLU/RSV plus is a rapid, multiplexed real-time PCR test intended for the simultaneous qualitative detection and differentiation of SARS-CoV-2, Influenza A, Influenza B, and respiratory syncytial virus (RSV) viral RNA in either nasopharyngeal swab or nasal swab specimens.         STREP GROUP A BY PCR - Normal    Narrative:     The Xpert Xpress Strep A test is a rapid, qualitative in vitro diagnostic test for the detection of Streptococcus pyogenes (Group A ß-hemolytic Streptococcus, Strep A) in throat swab specimens from patients with signs and symptoms of pharyngitis.     CBC W/ AUTO DIFFERENTIAL     Narrative:     The following orders were created for panel order CBC auto differential.  Procedure                               Abnormality         Status                     ---------                               -----------         ------                     CBC with Differential[[id number]]        Abnormal            Final result                 Please view results for these tests on the individual orders.   EXTRA TUBES    Narrative:     The following orders were created for panel order EXTRA TUBES.  Procedure                               Abnormality         Status                     ---------                               -----------         ------                     Light Blue Top Hold[[id number]]                              In process                 Light Green Top Hold[[id number]]                             In process                 Lavender Top Hold[[id number]]                                In process                 Gold Top Hold[[id number]]                                    In process                   Please view results for these tests on the individual orders.   LIGHT BLUE TOP HOLD   LIGHT GREEN TOP HOLD   LAVENDER TOP HOLD   GOLD TOP HOLD     Imaging Results              CT Chest Without Contrast (Final result)  Result time 09/09/23 10:01:34      Final result by Itzel Parra MD (09/09/23 10:01:34)                   Impression:      Gallstones    Nonspecific finding a air filled mildly prominent esophagus throughout its entire course.    There is concurrence with the preliminary report      Electronically signed by: Itzel Parra  Date:    09/09/2023  Time:    10:01               Narrative:    EXAMINATION:  CT CHEST WITHOUT CONTRAST    CLINICAL HISTORY:  Respiratory illness, nondiagnostic xray;    TECHNIQUE:  Low dose axial images, sagittal and coronal reformations were obtained from the thoracic inlet to the lung bases. Contrast was not administered.  Automatic exposure control is  utilized to reduce patient radiation exposure.    COMPARISON:  None.    FINDINGS:  The lungs are adequately aerated.  No infiltrate is seen.  No mass is seen.  No lesion is seen.  No pleural effusion is seen.  No pleural thickening is seen.  The mediastinum appears grossly unremarkable.  No abnormal lymphadenopathy is seen.  Thoracic aorta appears grossly unremarkable.  Heart appears normal.    Visualized portions of the upper abdomen shows evidence of gallstones.  The esophagus is also air-filled mildly dilated throughout its entire course..                        Wet Read by Cuco Johnson DO (09/09/23 02:14:01, [facility], [facility])    No lobar consolidation.                                     X-Ray Chest AP Portable (Final result)  Result time 09/09/23 11:37:44      Final result by Anton Salmeron MD (09/09/23 11:37:44)                   Impression:      No acute findings.      Electronically signed by: Anton Salmeron  Date:    09/09/2023  Time:    11:37               Narrative:    EXAMINATION:  XR CHEST AP PORTABLE    CLINICAL HISTORY:  cough;    COMPARISON:  21 September 2022    FINDINGS:  Portable frontal view of the chest was obtained. The heart is not enlarged.  Lungs are clear.  There is no pneumothorax or significant effusion.                        Final result by Anton Salmeron MD (09/09/23 11:37:44)                   Impression:      No acute findings.      Electronically signed by: Anton Salmeron  Date:    09/09/2023  Time:    11:37               Narrative:    EXAMINATION:  XR CHEST AP PORTABLE    CLINICAL HISTORY:  cough;    COMPARISON:  21 September 2022    FINDINGS:  Portable frontal view of the chest was obtained. The heart is not enlarged.  Lungs are clear.  There is no pneumothorax or significant effusion.                        Final result by Anton Salmeron MD (09/09/23 11:37:44)                   Impression:      No acute findings.      Electronically  signed by: Anton Salmeron  Date:    09/09/2023  Time:    11:37               Narrative:    EXAMINATION:  XR CHEST AP PORTABLE    CLINICAL HISTORY:  cough;    COMPARISON:  21 September 2022    FINDINGS:  Portable frontal view of the chest was obtained. The heart is not enlarged.  Lungs are clear.  There is no pneumothorax or significant effusion.                        Wet Read by Cuco Johnson DO (09/08/23 23:42:12, [facility], [facility])    Normal cardiomediastinal silhouette.  No dense lobar consolidation or pneumothorax.                                    PROCEDURES:  Procedures    ECG:  EKG Readings: (Independently Interpreted)   Initial Reading: No STEMI. Rhythm: Sinus Tachycardia. Heart Rate: 124. Ectopy: No Ectopy. Conduction: Normal. Axis: Normal.       ED COURSE AND MEDICAL DECISION MAKING:  Medications   sodium chloride 0.9% bolus 1,000 mL 1,000 mL (0 mLs Intravenous Stopped 9/9/23 0040)     ED Course as of 09/11/23 0752   Sat Sep 09, 2023   0021 Sodium(!): 148 [IB]   0021 Chloride(!): 115 [IB]   0021 BUN: 17.9 [IB]   0021 Creatinine: 1.03 [IB]   0021 WBC: 10.61 [IB]   0021 Hemoglobin: 15.8 [IB]   0021 Hematocrit: 48.1 [IB]      ED Course User Index  [IB] Cuco Johnson DO        Medical Decision Making  42-year-old male who presents with caregiver with flu-like symptoms that began today having nonproductive cough, body aches, fever, and runny nose.  CBC shows no leukocytosis or leukopenia or anemia.  BMP shows hyponatremia 148 with normal BUN and creatinine.  COVID and flu negative.  Strep negative.  Chest x-ray shows no acute intrathoracic process.  CT chest was obtained to evaluate for pneumonia not radiographically apparent, this showed no acute intrathoracic process.  He was given IV hydration with normal saline.  Discussed symptomatic therapy to continue at home and we will discharge with Tessalon Perles as needed for cough.  Given strict ED return precautions. I  have spoken with the patient and/or caregivers. I have explained the patient's condition, diagnoses and treatment plan based on the information available to me at this time. I have answered the patient's and/or caregiver's questions and addressed any concerns. The patient and/or caregivers have as good an understanding of the patient's diagnosis, condition and treatment plan as can be expected at this point. The vital signs have been stable. The patient's condition is stable and appropriate for discharge from the emergency department.     The patient will pursue further outpatient evaluation with the primary care physician or other designated or consulting physician as outlined in the discharge instructions. The patient and/or caregivers are agreeable to this plan of care and follow-up instructions have been explained in detail. The patient and/or caregivers have received these instructions in written format and have expressed an understanding of the discharge instructions. The patient and/or caregivers are aware that any significant change in condition or worsening of symptoms should prompt an immediate return to this or the closest emergency department or a call to 911.    Amount and/or Complexity of Data Reviewed  Independent Historian: caregiver  Labs: ordered. Decision-making details documented in ED Course.  Radiology: ordered and independent interpretation performed.    Risk  OTC drugs.  Prescription drug management.  Diagnosis or treatment significantly limited by social determinants of health.        CLINICAL IMPRESSION:  1. Viral URI with cough    2. Tachycardia        DISPOSITION:   ED Disposition Condition    Discharge Stable            ED Prescriptions       Medication Sig Dispense Start Date End Date Auth. Provider    benzonatate (TESSALON) 100 MG capsule Take 1 capsule (100 mg total) by mouth 3 (three) times daily as needed. 20 capsule 9/9/2023 9/19/2023 Cuco Johnson, DO          Follow-up  Information       Follow up With Specialties Details Why Contact Info    [facility] Emergency [facility]   [address] [facility] [place] [address] [phone]               Cuco Johnson DO  09/11/23 0752

## 2024-02-06 ENCOUNTER — OFFICE VISIT (OUTPATIENT)
Dept: URGENT CARE | Facility: CLINIC | Age: 44
End: 2024-02-06
Payer: MEDICARE

## 2024-02-06 VITALS
SYSTOLIC BLOOD PRESSURE: 95 MMHG | DIASTOLIC BLOOD PRESSURE: 62 MMHG | TEMPERATURE: 98 F | RESPIRATION RATE: 16 BRPM | HEART RATE: 69 BPM | HEIGHT: 72 IN | WEIGHT: 142.13 LBS | OXYGEN SATURATION: 97 % | BODY MASS INDEX: 19.25 KG/M2

## 2024-02-06 DIAGNOSIS — R09.89 SYMPTOMS OF UPPER RESPIRATORY INFECTION (URI): ICD-10-CM

## 2024-02-06 DIAGNOSIS — J11.1 INFLUENZA: Primary | ICD-10-CM

## 2024-02-06 LAB
CTP QC/QA: YES
MOLECULAR STREP A: NEGATIVE
POC MOLECULAR INFLUENZA A AGN: NEGATIVE
POC MOLECULAR INFLUENZA B AGN: POSITIVE
SARS-COV-2 RDRP RESP QL NAA+PROBE: NEGATIVE

## 2024-02-06 PROCEDURE — 99214 OFFICE O/P EST MOD 30 MIN: CPT | Mod: PBBFAC | Performed by: FAMILY MEDICINE

## 2024-02-06 PROCEDURE — 87502 INFLUENZA DNA AMP PROBE: CPT | Mod: PBBFAC | Performed by: FAMILY MEDICINE

## 2024-02-06 PROCEDURE — 87635 SARS-COV-2 COVID-19 AMP PRB: CPT | Mod: PBBFAC | Performed by: FAMILY MEDICINE

## 2024-02-06 PROCEDURE — 87651 STREP A DNA AMP PROBE: CPT | Mod: PBBFAC | Performed by: FAMILY MEDICINE

## 2024-02-06 PROCEDURE — 99213 OFFICE O/P EST LOW 20 MIN: CPT | Mod: S$PBB,,, | Performed by: FAMILY MEDICINE

## 2024-02-06 RX ORDER — POLYETHYLENE GLYCOL 3350 17 G/17G
17 POWDER, FOR SOLUTION ORAL
COMMUNITY

## 2024-02-06 RX ORDER — OSELTAMIVIR PHOSPHATE 75 MG/1
75 CAPSULE ORAL 2 TIMES DAILY
Qty: 10 CAPSULE | Refills: 0 | Status: SHIPPED | OUTPATIENT
Start: 2024-02-06 | End: 2024-02-11

## 2024-02-06 RX ORDER — MUPIROCIN 20 MG/G
OINTMENT TOPICAL
COMMUNITY
Start: 2024-01-02

## 2024-02-06 RX ORDER — CLONAZEPAM 0.5 MG/1
TABLET ORAL
COMMUNITY
Start: 2024-01-22

## 2024-02-06 NOTE — PROGRESS NOTES
Subjective:       Patient ID: Miguelito Landin Jr. is a 43 y.o. male.    Vitals:  height is 6' (1.829 m) and weight is 64.5 kg (142 lb 1.6 oz). His temperature is 97.9 °F (36.6 °C). His blood pressure is 95/62 and his pulse is 69. His respiration is 16 and oxygen saturation is 97%.     Chief Complaint: URI (Runny nose, body aches, chills, diarrhea x 2 days.)    Exposure to influenza    URI   Associated symptoms include coughing, diarrhea, joint pain and rhinorrhea. Pertinent negatives include no abdominal pain, neck pain, rash, vomiting or wheezing.         HENT:  Negative for ear discharge, drooling, facial swelling and trouble swallowing.    Neck: Negative for neck pain and neck stiffness.   Respiratory:  Positive for cough. Negative for bloody sputum, shortness of breath and wheezing.    Gastrointestinal:  Positive for diarrhea. Negative for abdominal pain and vomiting.   Skin:  Negative for rash.       Objective:   Physical Exam   Constitutional: He appears well-developed.  Non-toxic appearance. He does not appear ill. No distress.   HENT:   Head: Atraumatic.   Nose: No purulent discharge. Right sinus exhibits no maxillary sinus tenderness and no frontal sinus tenderness. Left sinus exhibits no maxillary sinus tenderness and no frontal sinus tenderness.   Mouth/Throat: Uvula is midline.   Eyes: Right eye exhibits no discharge. Left eye exhibits no discharge. Extraocular movement intact   Neck: Neck supple. No neck rigidity present.   Cardiovascular: Regular rhythm.   Pulmonary/Chest: Effort normal and breath sounds normal. No respiratory distress. He has no wheezes. He has no rales.   Lymphadenopathy:     He has no cervical adenopathy.   Neurological: He is alert.   Skin: Skin is warm, dry and not diaphoretic.   Psychiatric: His behavior is normal.   Nursing note and vitals reviewed.    Results for orders placed or performed in visit on 02/06/24   POCT COVID-19 Rapid Screening   Result Value Ref Range    POC  Rapid COVID Negative Negative     Acceptable Yes    POCT Influenza A/B Molecular   Result Value Ref Range    POC Molecular Influenza A Ag Negative Negative, Not Reported    POC Molecular Influenza B Ag Positive (A) Negative, Not Reported     Acceptable Yes    POCT Strep A, Molecular   Result Value Ref Range    Molecular Strep A, POC Negative Negative     Acceptable Yes          Assessment:     1. Influenza    2. Symptoms of upper respiratory infection (URI)          Plan:   Will give a course of Tamiflu.  Encouraged fluids.  Discussed contagious precautions.  Provided excuse if indicated.    Please follow instructions on patient education material.  Return to urgent care in 3-4 days if symptoms are not improving. Seek care immediately if new or worsening symptoms develop.    Influenza  -     oseltamivir (TAMIFLU) 75 MG capsule; Take 1 capsule (75 mg total) by mouth 2 (two) times daily. for 5 days  Dispense: 10 capsule; Refill: 0    Symptoms of upper respiratory infection (URI)  -     POCT COVID-19 Rapid Screening  -     POCT Influenza A/B Molecular  -     POCT Strep A, Molecular        Please note: This chart was completed via voice to text dictation. It may contain typographical/word recognition errors. If there are any questions, please contact the provider for final clarification.

## 2024-04-06 ENCOUNTER — HOSPITAL ENCOUNTER (EMERGENCY)
Facility: HOSPITAL | Age: 44
Discharge: HOME | End: 2024-04-06
Attending: EMERGENCY MEDICINE
Payer: MEDICARE

## 2024-04-06 VITALS
WEIGHT: 115 LBS | HEIGHT: 71 IN | OXYGEN SATURATION: 95 % | DIASTOLIC BLOOD PRESSURE: 77 MMHG | HEART RATE: 82 BPM | RESPIRATION RATE: 18 BRPM | BODY MASS INDEX: 16.1 KG/M2 | SYSTOLIC BLOOD PRESSURE: 122 MMHG | TEMPERATURE: 98 F

## 2024-04-06 DIAGNOSIS — J06.9 UPPER RESPIRATORY TRACT INFECTION, UNSPECIFIED TYPE: Primary | ICD-10-CM

## 2024-04-06 LAB
FLUAV AG UPPER RESP QL IA.RAPID: NOT DETECTED
FLUBV AG UPPER RESP QL IA.RAPID: NOT DETECTED
RSV A 5' UTR RNA NPH QL NAA+PROBE: NOT DETECTED
SARS-COV-2 RNA RESP QL NAA+PROBE: NOT DETECTED
STREP A PCR (OHS): NOT DETECTED

## 2024-04-06 PROCEDURE — 99282 EMERGENCY DEPT VISIT SF MDM: CPT

## 2024-04-06 PROCEDURE — 0241U COVID/RSV/FLU A&B PCR: CPT | Performed by: NURSE PRACTITIONER

## 2024-04-06 PROCEDURE — 87651 STREP A DNA AMP PROBE: CPT | Performed by: NURSE PRACTITIONER

## 2024-04-06 NOTE — ED PROVIDER NOTES
Encounter Date: 4/6/2024       History     Chief Complaint   Patient presents with    Generalized Body Aches     Body aches and congestion since last night. Denies cough. Current temp=98.1. vss. nadn     The patient presents with body aches, nasal congestion, subjective fever, no cp or sob, no known covid-19 exposure.  The onset was 2 days ago.  The course/duration of symptoms is constant.  The degree at present is minimal.  The exacerbating factor is none.  The relieving factor is none.  Risk factors consist of none.  Prior episodes: occasional.  Associated symptoms: denies chest pain and denies shortness of breath.  Additional history: he has cerebral palsy, is in wheelchair, and is here with his caretaker.         Review of patient's allergies indicates:   Allergen Reactions    Penicillins      unknown    Doxycycline Rash     Past Medical History:   Diagnosis Date    Anxiety     CP (cerebral palsy)     Spasticity      History reviewed. No pertinent surgical history.  Family History   Problem Relation Age of Onset    Panic disorder Mother     Migraines Mother     Cancer Paternal Uncle     Heart attack Maternal Grandmother      Social History     Tobacco Use    Smoking status: Never    Smokeless tobacco: Never   Substance Use Topics    Alcohol use: No    Drug use: No     Review of Systems   Constitutional:  Positive for fever.   HENT:  Positive for congestion. Negative for sore throat.    Respiratory:  Negative for shortness of breath.    Cardiovascular:  Negative for chest pain.   Gastrointestinal:  Negative for nausea.   Genitourinary:  Negative for dysuria.   Musculoskeletal:  Positive for myalgias. Negative for back pain.   Skin:  Negative for rash.   Neurological:  Negative for weakness.   Hematological:  Does not bruise/bleed easily.   All other systems reviewed and are negative.      Physical Exam     Initial Vitals [04/06/24 1243]   BP Pulse Resp Temp SpO2   122/77 82 18 98.1 °F (36.7 °C) 95 %      MAP        --         Physical Exam    Nursing note and vitals reviewed.  Constitutional: He appears well-developed and well-nourished.   HENT:   Head: Normocephalic and atraumatic.   Right Ear: Tympanic membrane normal.   Left Ear: Tympanic membrane normal.   Nose: Nose normal.   Mouth/Throat: Uvula is midline, oropharynx is clear and moist and mucous membranes are normal.   Neck: Neck supple.   Normal range of motion.  Cardiovascular:  Normal rate, regular rhythm, normal heart sounds and intact distal pulses.           Pulmonary/Chest: Effort normal and breath sounds normal. He has no decreased breath sounds.   Abdominal: Abdomen is soft and flat. Bowel sounds are normal. There is no abdominal tenderness.   Musculoskeletal:      Cervical back: Normal range of motion and neck supple.      Comments: Diffuse muscle atrophy, with contractures     Neurological: He is alert and oriented to person, place, and time. He has normal strength.   Skin: Skin is warm and dry.   Psychiatric: He has a normal mood and affect.         ED Course   Procedures  Labs Reviewed   COVID/RSV/FLU A&B PCR - Normal    Narrative:     The Xpert Xpress SARS-CoV-2/FLU/RSV plus is a rapid, multiplexed real-time PCR test intended for the simultaneous qualitative detection and differentiation of SARS-CoV-2, Influenza A, Influenza B, and respiratory syncytial virus (RSV) viral RNA in either nasopharyngeal swab or nasal swab specimens.         STREP GROUP A BY PCR - Normal    Narrative:     The Xpert Xpress Strep A test is a rapid, qualitative in vitro diagnostic test for the detection of Streptococcus pyogenes (Group A ß-hemolytic Streptococcus, Strep A) in throat swab specimens from patients with signs and symptoms of pharyngitis.            Imaging Results    None          Medications - No data to display  Medical Decision Making  The patient presents with body aches, nasal congestion, subjective fever, no cp or sob, no known covid-19 exposure.  The onset  was 2 days ago.  The course/duration of symptoms is constant.  The degree at present is minimal.  The exacerbating factor is none.  The relieving factor is none.  Risk factors consist of none.  Prior episodes: occasional.  Associated symptoms: denies chest pain and denies shortness of breath.  Additional history: he has cerebral palsy, is in wheelchair, and is here with his caretaker.       He will take otc medication if needed. 2:10 PM DISPOSITION: The patient is resting comfortably in no acute distress.  He is hemodynamically stable and is without objective evidence for acute process requiring urgent intervention or hospitalization. I provided counseling to patient with regard to condition, the treatment plan, specific conditions for return, and the importance of follow up. Detailed written and verbal instructions provided to patient and he expressed a verbal understanding of the discharge instructions and overall management plan. Reiterated the importance of medication administration and safety and advised patient to follow up with primary care provider in 3-5 days or sooner if needed.  Answered questions at this time. The patient is stable for discharge.       Amount and/or Complexity of Data Reviewed  Labs: ordered.      Additional MDM:   Differential Diagnosis:   At this time differential diagnosis is but not limited to allergic rhinitis, rsv, covid, influenza              ED Course as of 04/06/24 1412   Sat Apr 06, 2024   1410 Influenza A, Molecular: Not Detected [RB]   1410 Influenza B, Molecular: Not Detected [RB]   1410 RSV Ag by Molecular Method: Not Detected [RB]   1410 SARS-CoV2 (COVID-19) Qualitative PCR: Not Detected [RB]   1410 STREP A PCR ([facility]): Not Detected [RB]      ED Course User Index  [RB] Kory Rae, ACNP                           Clinical Impression:  Final diagnoses:  [J06.9] Upper respiratory tract infection, unspecified type (Primary)          ED Disposition Condition    Discharge Stable           ED Prescriptions    None       Follow-up Information       Follow up With Specialties Details Why Contact Info    follow up with your primary care provider in 3-5 days        [facility] Emergency Medicine  If symptoms worsen [address] [id number] [phone]             Kory Rae, ACNP  04/06/24 1414

## 2024-06-05 ENCOUNTER — HOSPITAL ENCOUNTER (EMERGENCY)
Facility: HOSPITAL | Age: 44
Discharge: HOME | End: 2024-06-05
Attending: INTERNAL MEDICINE
Payer: MEDICARE

## 2024-06-05 VITALS
BODY MASS INDEX: 16.11 KG/M2 | SYSTOLIC BLOOD PRESSURE: 118 MMHG | RESPIRATION RATE: 18 BRPM | HEART RATE: 76 BPM | OXYGEN SATURATION: 98 % | HEIGHT: 71 IN | TEMPERATURE: 98 F | DIASTOLIC BLOOD PRESSURE: 72 MMHG | WEIGHT: 115.06 LBS

## 2024-06-05 DIAGNOSIS — Z13.9 ENCOUNTER FOR MEDICAL SCREENING EXAMINATION: Primary | ICD-10-CM

## 2024-06-05 PROCEDURE — 99282 EMERGENCY DEPT VISIT SF MDM: CPT
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
## 2024-08-03 ENCOUNTER — HOSPITAL ENCOUNTER (EMERGENCY)
Facility: HOSPITAL | Age: 44
Discharge: HOME | End: 2024-08-03
Attending: EMERGENCY MEDICINE
Payer: MEDICARE

## 2024-08-03 VITALS
RESPIRATION RATE: 20 BRPM | DIASTOLIC BLOOD PRESSURE: 82 MMHG | SYSTOLIC BLOOD PRESSURE: 123 MMHG | HEIGHT: 71 IN | OXYGEN SATURATION: 97 % | TEMPERATURE: 99 F | BODY MASS INDEX: 16.11 KG/M2 | WEIGHT: 115.06 LBS | HEART RATE: 88 BPM
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
## 2024-08-03 PROCEDURE — 99281 EMR DPT VST MAYX REQ PHY/QHP: CPT
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
